# Patient Record
Sex: FEMALE | Race: BLACK OR AFRICAN AMERICAN | Employment: FULL TIME | URBAN - METROPOLITAN AREA
[De-identification: names, ages, dates, MRNs, and addresses within clinical notes are randomized per-mention and may not be internally consistent; named-entity substitution may affect disease eponyms.]

---

## 2022-06-07 ENCOUNTER — OFFICE VISIT (OUTPATIENT)
Dept: PRIMARY CARE CLINIC | Age: 27
End: 2022-06-07
Payer: COMMERCIAL

## 2022-06-07 ENCOUNTER — HOSPITAL ENCOUNTER (OUTPATIENT)
Dept: GENERAL RADIOLOGY | Age: 27
Discharge: HOME | End: 2022-06-07
Attending: INTERNAL MEDICINE
Payer: COMMERCIAL

## 2022-06-07 VITALS
BODY MASS INDEX: 25.81 KG/M2 | DIASTOLIC BLOOD PRESSURE: 62 MMHG | WEIGHT: 160.6 LBS | HEIGHT: 66 IN | HEART RATE: 79 BPM | SYSTOLIC BLOOD PRESSURE: 98 MMHG | TEMPERATURE: 97.5 F | RESPIRATION RATE: 16 BRPM | OXYGEN SATURATION: 100 %

## 2022-06-07 DIAGNOSIS — R63.4 WEIGHT LOSS: ICD-10-CM

## 2022-06-07 DIAGNOSIS — L21.9 SEBORRHEIC DERMATITIS OF SCALP: ICD-10-CM

## 2022-06-07 DIAGNOSIS — R53.83 FATIGUE, UNSPECIFIED TYPE: ICD-10-CM

## 2022-06-07 DIAGNOSIS — K59.09 OTHER CONSTIPATION: Primary | ICD-10-CM

## 2022-06-07 DIAGNOSIS — R19.7 DIARRHEA, UNSPECIFIED TYPE: ICD-10-CM

## 2022-06-07 DIAGNOSIS — K59.09 OTHER CONSTIPATION: ICD-10-CM

## 2022-06-07 LAB
ALBUMIN SERPL-MCNC: 3.6 G/DL (ref 3.5–5)
ALBUMIN/GLOB SERPL: 1 {RATIO} (ref 1.1–2.2)
ALP SERPL-CCNC: 38 U/L (ref 45–117)
ALT SERPL-CCNC: 16 U/L (ref 12–78)
ANION GAP SERPL CALC-SCNC: 8 MMOL/L (ref 5–15)
AST SERPL-CCNC: 13 U/L (ref 15–37)
BASOPHILS # BLD: 0.1 K/UL (ref 0–0.1)
BASOPHILS NFR BLD: 1 % (ref 0–1)
BILIRUB SERPL-MCNC: 0.3 MG/DL (ref 0.2–1)
BUN SERPL-MCNC: 11 MG/DL (ref 6–20)
BUN/CREAT SERPL: 12 (ref 12–20)
CALCIUM SERPL-MCNC: 9.5 MG/DL (ref 8.5–10.1)
CHLORIDE SERPL-SCNC: 106 MMOL/L (ref 97–108)
CO2 SERPL-SCNC: 25 MMOL/L (ref 21–32)
COMMENT, HOLDF: NORMAL
CREAT SERPL-MCNC: 0.92 MG/DL (ref 0.55–1.02)
DIFFERENTIAL METHOD BLD: NORMAL
EOSINOPHIL # BLD: 0.2 K/UL (ref 0–0.4)
EOSINOPHIL NFR BLD: 6 % (ref 0–7)
ERYTHROCYTE [DISTWIDTH] IN BLOOD BY AUTOMATED COUNT: 14.1 % (ref 11.5–14.5)
GLOBULIN SER CALC-MCNC: 3.7 G/DL (ref 2–4)
GLUCOSE SERPL-MCNC: 99 MG/DL (ref 65–100)
HCT VFR BLD AUTO: 37.8 % (ref 35–47)
HGB BLD-MCNC: 12.1 G/DL (ref 11.5–16)
IMM GRANULOCYTES # BLD AUTO: 0 K/UL (ref 0–0.04)
IMM GRANULOCYTES NFR BLD AUTO: 0 % (ref 0–0.5)
LYMPHOCYTES # BLD: 1.6 K/UL (ref 0.8–3.5)
LYMPHOCYTES NFR BLD: 37 % (ref 12–49)
MCH RBC QN AUTO: 26.1 PG (ref 26–34)
MCHC RBC AUTO-ENTMCNC: 32 G/DL (ref 30–36.5)
MCV RBC AUTO: 81.6 FL (ref 80–99)
MONOCYTES # BLD: 0.3 K/UL (ref 0–1)
MONOCYTES NFR BLD: 7 % (ref 5–13)
NEUTS SEG # BLD: 2.1 K/UL (ref 1.8–8)
NEUTS SEG NFR BLD: 49 % (ref 32–75)
NRBC # BLD: 0 K/UL (ref 0–0.01)
NRBC BLD-RTO: 0 PER 100 WBC
PLATELET # BLD AUTO: 280 K/UL (ref 150–400)
PMV BLD AUTO: 11.5 FL (ref 8.9–12.9)
POTASSIUM SERPL-SCNC: 4.2 MMOL/L (ref 3.5–5.1)
PROT SERPL-MCNC: 7.3 G/DL (ref 6.4–8.2)
RBC # BLD AUTO: 4.63 M/UL (ref 3.8–5.2)
SAMPLES BEING HELD,HOLD: NORMAL
SODIUM SERPL-SCNC: 139 MMOL/L (ref 136–145)
T4 FREE SERPL-MCNC: 1 NG/DL (ref 0.8–1.5)
TSH SERPL DL<=0.05 MIU/L-ACNC: 2.35 UIU/ML (ref 0.36–3.74)
WBC # BLD AUTO: 4.2 K/UL (ref 3.6–11)

## 2022-06-07 PROCEDURE — 74018 RADEX ABDOMEN 1 VIEW: CPT

## 2022-06-07 PROCEDURE — 99204 OFFICE O/P NEW MOD 45 MIN: CPT | Performed by: INTERNAL MEDICINE

## 2022-06-07 RX ORDER — POLYETHYLENE GLYCOL 3350 17 G/17G
17 POWDER, FOR SOLUTION ORAL DAILY
Qty: 850 G | Refills: 4 | Status: SHIPPED | OUTPATIENT
Start: 2022-06-07

## 2022-06-07 RX ORDER — DOCUSATE SODIUM 100 MG/1
100 CAPSULE, LIQUID FILLED ORAL 2 TIMES DAILY
Qty: 60 CAPSULE | Refills: 2 | Status: SHIPPED | OUTPATIENT
Start: 2022-06-07 | End: 2022-09-05

## 2022-06-07 RX ORDER — KETOCONAZOLE 20 MG/ML
SHAMPOO TOPICAL
Qty: 120 ML | Refills: 3 | Status: SHIPPED | OUTPATIENT
Start: 2022-06-07

## 2022-06-07 RX ORDER — SERTRALINE HYDROCHLORIDE 50 MG/1
TABLET, FILM COATED ORAL
COMMUNITY
Start: 2022-04-27

## 2022-06-07 RX ORDER — NORGESTIMATE AND ETHINYL ESTRADIOL 0.25-0.035
1 KIT ORAL DAILY
COMMUNITY
Start: 2022-03-26

## 2022-06-07 NOTE — PROGRESS NOTES
X-ray abdomen shows large amount of stool. I already prescribed MiraLAX and docusate.   Please take it as directed

## 2022-06-07 NOTE — PROGRESS NOTES
Maria Dolores Rios is a 33 y.o.  female and presents with     Chief Complaint   Patient presents with    New Patient    GI Problem     abdominal pain x 1 year        Pt is here to establish care. Pt has diarrhea and constipation, upper abdominal pain, headaches. Headaches - back of head. Dermatitis of scalp. Fatigue, loss of appetitive , 10 pound wt loss. 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 Pt feels anxious. GI issues started in 2020  Pt saw primary in 2020 in [place]. Was  given meds for GERD. No blood work or ultrasound. Had one pregnancy. NO surgeries. Mom and sister have GI issues. Mom has IBS. Brother has GERD. Tried OTC for GERD. Pt has BM once or twice a week. NO thyroid issues. NO h/o Inflammatory bowel disease. NO b blood in stools. NO black stools. No past medical history on file. No past surgical history on file. Current Outpatient Medications   Medication Sig    Estarylla 0.25-35 mg-mcg tab Take 1 Tablet by mouth daily.  sertraline (ZOLOFT) 50 mg tablet     polyethylene glycol (MIRALAX) 17 gram/dose powder Take 17 g by mouth daily.  docusate sodium (COLACE) 100 mg capsule Take 1 Capsule by mouth two (2) times a day for 90 days.  ketoconazole (NIZORAL) 2 % shampoo Apply to scalp twice weekly     No current facility-administered medications for this visit. Health Maintenance   Topic Date Due    Hepatitis C Screening  Never done    HPV Age 9Y-34Y (1 - 2-dose series) Never done    DTaP/Tdap/Td series (1 - Tdap) Never done    Pap Smear  Never done    COVID-19 Vaccine (3 - Booster for Moderna series) 07/14/2022    Flu Vaccine (Season Ended) 09/01/2022    Depression Screen  06/07/2023    Pneumococcal 0-64 years  Aged [employer] History   Administered Date(s) Administered    COVID-19, Arn[place]eba Dun, Primary or Immunocompromised Series, MRNA, PF, 100mcg/0.5mL 01/17/2022, 02/14/2022     Patient's last menstrual period was 06/02/2022.         Allergies and Intolerances:   No Known Allergies    Family History:   Family History   Problem Relation Age of Onset    Hypertension Mother     Hypertension Sister        Social History:   She  reports that she has never smoked. She has never used smokeless tobacco.  She  reports no history of alcohol use. Review of Systems:   General: negative for - chills, fatigue, fever, weight change  Psych: negative for - anxiety, depression, irritability or mood swings  ENT: negative for - headaches, hearing change, nasal congestion, oral lesions, sneezing or sore throat  Heme/ Lymph: negative for - bleeding problems, bruising, pallor or swollen lymph nodes  Endo: negative for - hot flashes, polydipsia/polyuria or temperature intolerance  Resp: negative for - cough, shortness of breath or wheezing  CV: negative for - chest pain, edema or palpitations  GI: negative for - abdominal pain, change in bowel habits, constipation, diarrhea or nausea/vomiting  : negative for - dysuria, hematuria, incontinence, pelvic pain or vulvar/vaginal symptoms  MSK: negative for - joint pain, joint swelling or muscle pain  Neuro: negative for - confusion, headaches, seizures or weakness  Derm: negative for - dry skin, hair changes, rash or skin lesion changes          Physical:   Vitals:   Vitals:    06/07/22 0758   BP: 98/62   Pulse: 79   Resp: 16   Temp: 97.5 °F (36.4 °C)   TempSrc: Temporal   SpO2: 100%   Weight: 160 lb 9.6 oz (72.8 kg)   Height: 5' 6.25\" (1.683 m)           Exam:   HEENT- atraumatic,normocephalic, awake, oriented, well nourished  Neck - supple,no enlarged lymph nodes, no JVD, no thyromegaly  Chest- CTA, no rhonchi, no crackles  Heart- rrr, no murmurs / gallop/rub  Abdomen- soft,BS+,NT, no hepatosplenomegaly  Ext - no c/c/edema   Neuro- no focal deficits. Power 5/5 all extremities  Skin - warm,dry, no obvious rashes.           Review of Data:   LABS:   No results found for: WBC, HGB, HCT, PLT, HGBEXT, HCTEXT, PLTEXT, HGBEXT, HCTEXT, PLTEXT  No results found for: NA, K, CL, CO2, GLU, BUN, CREA  No results found for: CHOL, CHOLX, CHLST, CHOLV, HDL, HDLP, LDL, LDLC, DLDLP, TGLX, TRIGL, TRIGP  No components found for: GPT        Impression / Plan:        ICD-10-CM ICD-9-CM    1. Other constipation  K59.09 564.09 CBC WITH AUTOMATED DIFF      METABOLIC PANEL, COMPREHENSIVE      TSH 3RD GENERATION      T4, FREE      XR ABD (KUB)      polyethylene glycol (MIRALAX) 17 gram/dose powder      docusate sodium (COLACE) 100 mg capsule      T4, FREE      TSH 3RD GENERATION      METABOLIC PANEL, COMPREHENSIVE      CBC WITH AUTOMATED DIFF   2. Diarrhea, unspecified type  R19.7 787.91 CBC WITH AUTOMATED DIFF      METABOLIC PANEL, COMPREHENSIVE      TSH 3RD GENERATION      T4, FREE      T4, FREE      TSH 3RD GENERATION      METABOLIC PANEL, COMPREHENSIVE      CBC WITH AUTOMATED DIFF   3. Weight loss  R63.4 783.21 TSH 3RD GENERATION      T4, FREE      T4, FREE      TSH 3RD GENERATION   4. Fatigue, unspecified type  R53.83 780.79    5. Seborrheic dermatitis of scalp  L21.9 690.18 ketoconazole (NIZORAL) 2 % shampoo       Explained to patient risk benefits of the medications. Advised patient to stop meds if having any side effects. Pt verbalized understanding of the instructions. I have discussed the diagnosis with the patient and the intended plan as seen in the above orders. The patient has received an after-visit summary and questions were answered concerning future plans. I have discussed medication side effects and warnings with the patient as well. I have reviewed the plan of care with the patient, accepted their input and they are in agreement with the treatment goals. Reviewed plan of care. Patient has provided input and agrees with goals. Follow-up and Dispositions    · Return in about 6 weeks (around 7/19/2022).          Jossie Leal MD

## 2022-06-07 NOTE — PROGRESS NOTES
Chief Complaint   Patient presents with    New Patient    GI Problem     abdominal pain x 1 year         Visit Vitals  BP 98/62 (BP 1 Location: Left upper arm, BP Patient Position: Sitting)   Pulse 79   Temp 97.5 °F (36.4 °C) (Temporal)   Resp 16   Ht 5' 6.25\" (1.683 m)   Wt 160 lb 9.6 oz (72.8 kg)   LMP 06/02/2022   SpO2 100%   BMI 25.73 kg/m²        Health Maintenance Due   Topic    Hepatitis C Screening     Depression Screen     COVID-19 Vaccine (1)    HPV Age 9Y-26Y (1 - 2-dose series)    DTaP/Tdap/Td series (1 - Tdap)    Pap Smear         1. Have you been to the ER, urgent care clinic since your last visit? Hospitalized since your last visit? No    2. Have you seen or consulted any other health care providers outside of the [address] [place] [address] since your last visit? Include any pap smears or colon screening.  No

## 2022-10-19 ENCOUNTER — TELEPHONE (OUTPATIENT)
Dept: PRIMARY CARE CLINIC | Age: 27
End: 2022-10-19

## 2022-10-19 ENCOUNTER — NURSE TRIAGE (OUTPATIENT)
Dept: OTHER | Facility: CLINIC | Age: 27
End: 2022-10-19

## 2022-10-19 NOTE — TELEPHONE ENCOUNTER
Abdominal pain with other symptoms that she didn't want to tell me. She was looking to come in tomorrow or Friday.

## 2022-10-19 NOTE — TELEPHONE ENCOUNTER
Location of patient: [address] [place] from [place] at [facility] with [employer]. Subjective: Caller states \"Stomach pain, worse since seeing PCP in June\"     Current Symptoms: Abdominal pain, saw PCP June for abdominal pain-had xray done, worse and worsen in past 24 hours, entire abdomen is bloated, pain is when eating or right after eating. Family history of stomach cancer. Blood in stool last week. Pain is 8 or 9 comes and goes but has been intermittent for >24 hours. Onset: 4 months ago; gradual, intermittent, worsening the last couple weeks    Associated Symptoms: NA    Pain Severity: Denies    Temperature: Denies    What has been tried: None    LMP:  9/19/22  Pregnant: No    Recommended disposition: See in Office Today    Care advice provided, patient verbalizes understanding; denies any other questions or concerns; instructed to call back for any new or worsening symptoms. Patient/Caller agrees with recommended disposition; writer provided warm transfer to Jose L Nelson at [facility] for appointment scheduling    Attention Provider: Thank you for allowing me to participate in the care of your patient. The patient was connected to triage in response to information provided to the [facility]. Please do not respond through this encounter as the response is not directed to a shared pool.       Reason for Disposition   MODERATE pain (e.g., interferes with normal activities that comes and goes (cramps) lasts > 24 hours  (Exception: Pain with Vomiting or Diarrhea - see that Protocol.)    Protocols used: Abdominal Pain - Female-ADULT-[place]

## 2022-10-19 NOTE — TELEPHONE ENCOUNTER
Patient called back. Patient reports having abdominal pain again. Cramping some under R rib, feeling bloated, patient reports still feeling constipated, has not taken anything over the counter for s/s ECC told her see needs to be seen by tomorrow. I told her I still have to check with Dr Willis Asif.

## 2022-10-21 ENCOUNTER — OFFICE VISIT (OUTPATIENT)
Dept: PRIMARY CARE CLINIC | Age: 27
End: 2022-10-21
Payer: COMMERCIAL

## 2022-10-21 VITALS
HEART RATE: 78 BPM | HEIGHT: 66 IN | TEMPERATURE: 99.7 F | BODY MASS INDEX: 25.39 KG/M2 | OXYGEN SATURATION: 99 % | DIASTOLIC BLOOD PRESSURE: 61 MMHG | WEIGHT: 158 LBS | SYSTOLIC BLOOD PRESSURE: 98 MMHG | RESPIRATION RATE: 18 BRPM

## 2022-10-21 DIAGNOSIS — R19.7 DIARRHEA OF PRESUMED INFECTIOUS ORIGIN: ICD-10-CM

## 2022-10-21 DIAGNOSIS — R10.84 GENERALIZED ABDOMINAL PAIN: ICD-10-CM

## 2022-10-21 DIAGNOSIS — K59.09 OTHER CONSTIPATION: Primary | ICD-10-CM

## 2022-10-21 DIAGNOSIS — K62.5 RECTAL BLEEDING: ICD-10-CM

## 2022-10-21 PROCEDURE — 99214 OFFICE O/P EST MOD 30 MIN: CPT | Performed by: INTERNAL MEDICINE

## 2022-10-21 RX ORDER — CIPROFLOXACIN 500 MG/1
500 TABLET ORAL 2 TIMES DAILY
Qty: 6 TABLET | Refills: 0 | Status: SHIPPED | OUTPATIENT
Start: 2022-10-21 | End: 2022-10-24

## 2022-10-21 NOTE — PROGRESS NOTES
Chief Complaint   Patient presents with    Bloated     Pain, loose stools for a few months      Anal Bleeding     Blood in stool a couple of times     1. \"Have you been to the ER, urgent care clinic since your last visit? Hospitalized since your last visit? \" No    2. \"Have you seen or consulted any other health care providers outside of the [address] [place] [address] since your last visit? \" No     3. For patients aged 39-70: Has the patient had a colonoscopy / FIT/ Cologuard? NA - based on age      If the patient is female:    4. For patients aged 41-77: Has the patient had a mammogram within the past 2 years? NA - based on age or sex      11. For patients aged 21-65: Has the patient had a pap smear? Yes - Care Gap present.  Rooming MA/LPN to request most recent results

## 2022-10-21 NOTE — PROGRESS NOTES
Fortino Oliveira is a 33 y.o.  female and presents with     Chief Complaint   Patient presents with    Bloated     Pain, loose stools for a few months      Anal Bleeding     Blood in stool a couple of times     Pt had 4-5 bowel movements in the past week. Pt feels bloated all the time. Patient reported that she had constipation in the past but now she is having diarrhea  Can eat small amount of food. Burning sensation in stomach., nausea , sharp pains in abdomen  Skin feels itchy  Dad has Crohns. Uncle had stomach ulcer. Noticed blood in stools. Also has joint pains- elbow pains  Works at [employer] as . NO alcohol. LMP - yesterday  Drinks 1-2 cups of tea. Is having loose stools or liquidy. Does not think it is food related. Lives by herself. History reviewed. No pertinent past medical history. History reviewed. No pertinent surgical history. Current Outpatient Medications   Medication Sig    ciprofloxacin HCl (CIPRO) 500 mg tablet Take 1 Tablet by mouth two (2) times a day for 3 days. Estarylla 0.25-35 mg-mcg tab Take 1 Tablet by mouth daily. sertraline (ZOLOFT) 50 mg tablet     polyethylene glycol (MIRALAX) 17 gram/dose powder Take 17 g by mouth daily. ketoconazole (NIZORAL) 2 % shampoo Apply to scalp twice weekly     No current facility-administered medications for this visit. Health Maintenance   Topic Date Due    Hepatitis C Screening  Never done    HPV Age 9Y-34Y (1 - 2-dose series) Never done    DTaP/Tdap/Td series (1 - Tdap) Never done    Pap Smear  Never done    COVID-19 Vaccine (3 - Booster for Moderna series) 07/14/2022    Flu Vaccine (1) Never done    Depression Screen  10/21/2023    Pneumococcal 0-64 years  Aged [employer] History   Administered Date(s) Administered    COVID-19, MODERNA BLUE border, Primary or Immunocompromised, (age 18y+), IM, 100 mcg/0.5mL 01/17/2022, 02/14/2022     Patient's last menstrual period was 10/20/2022.         Allergies and Intolerances:   No Known Allergies    Family History:   Family History   Problem Relation Age of Onset    Hypertension Mother     Hypertension Sister        Social History:   She  reports that she has never smoked. She has never used smokeless tobacco.  She  reports no history of alcohol use. Review of Systems:   General: negative for - chills, fatigue, fever, weight change  Psych: negative for - anxiety, depression, irritability or mood swings  ENT: negative for - headaches, hearing change, nasal congestion, oral lesions, sneezing or sore throat  Heme/ Lymph: negative for - bleeding problems, bruising, pallor or swollen lymph nodes  Endo: negative for - hot flashes, polydipsia/polyuria or temperature intolerance  Resp: negative for - cough, shortness of breath or wheezing  CV: negative for - chest pain, edema or palpitations  GI: negative for - abdominal pain, change in bowel habits, constipation, diarrhea or nausea/vomiting  : negative for - dysuria, hematuria, incontinence, pelvic pain or vulvar/vaginal symptoms  MSK: negative for - joint pain, joint swelling or muscle pain  Neuro: negative for - confusion, headaches, seizures or weakness  Derm: negative for - dry skin, hair changes, rash or skin lesion changes          Physical:   Vitals:   Vitals:    10/21/22 0854   BP: 98/61   Pulse: 78   Resp: 18   Temp: 99.7 °F (37.6 °C)   TempSrc: Oral   SpO2: 99%   Weight: 158 lb (71.7 kg)   Height: 5' 6\" (1.676 m)           Exam:   HEENT- atraumatic,normocephalic, awake, oriented, well nourished  Neck - supple,no enlarged lymph nodes, no JVD, no thyromegaly  Chest- CTA, no rhonchi, no crackles  Heart- rrr, no murmurs / gallop/rub  Abdomen- soft,BS+,NT, no hepatosplenomegaly  Ext - no c/c/edema   Neuro- no focal deficits. Power 5/5 all extremities  Skin - warm,dry, no obvious rashes.           Review of Data:   LABS:   Lab Results   Component Value Date/Time    WBC 4.2 06/07/2022 08:29 AM    HGB 12.1 06/07/2022 08:29 AM    HCT 37.8 06/07/2022 08:29 AM    PLATELET 913 00/79/4078 08:29 AM     Lab Results   Component Value Date/Time    Sodium 139 06/07/2022 08:29 AM    Potassium 4.2 06/07/2022 08:29 AM    Chloride 106 06/07/2022 08:29 AM    CO2 25 06/07/2022 08:29 AM    Glucose 99 06/07/2022 08:29 AM    BUN 11 06/07/2022 08:29 AM    Creatinine 0.92 06/07/2022 08:29 AM     No results found for: CHOL, CHOLX, CHLST, CHOLV, HDL, HDLP, LDL, LDLC, DLDLP, TGLX, TRIGL, TRIGP  No components found for: GPT        Impression / Plan:        ICD-10-CM ICD-9-CM    1. Other constipation  K59.09 564.09       2. Rectal bleeding  K62.5 569.3       3. Diarrhea of presumed infectious origin  R19.7 009.3 CBC WITH AUTOMATED DIFF      METABOLIC PANEL, COMPREHENSIVE      REFERRAL TO GASTROENTEROLOGY      ciprofloxacin HCl (CIPRO) 500 mg tablet      H. PYLORI BREATH TEST      4. Generalized abdominal pain  R10.84 789.07 US ABD COMP      LACTIC ACID      LIPASE            Explained to patient risk benefits of the medications. Advised patient to stop meds if having any side effects. Pt verbalized understanding of the instructions. I have discussed the diagnosis with the patient and the intended plan as seen in the above orders. The patient has received an after-visit summary and questions were answered concerning future plans. I have discussed medication side effects and warnings with the patient as well. I have reviewed the plan of care with the patient, accepted their input and they are in agreement with the treatment goals. Reviewed plan of care. Patient has provided input and agrees with goals. Follow-up and Dispositions    Return in about 2 weeks (around 11/4/2022).          Alice Grady MD

## 2022-10-26 ENCOUNTER — TELEPHONE (OUTPATIENT)
Dept: PRIMARY CARE CLINIC | Age: 27
End: 2022-10-26

## 2022-10-26 DIAGNOSIS — R10.84 ABDOMINAL PAIN, GENERALIZED: Primary | ICD-10-CM

## 2022-10-26 DIAGNOSIS — R19.7 DIARRHEA OF PRESUMED INFECTIOUS ORIGIN: ICD-10-CM

## 2022-10-26 NOTE — TELEPHONE ENCOUNTER
----- Message from Ernie Camarena sent at 10/24/2022  2:06 PM EDT -----  Regarding: Lab Notification:  Samples unable to be obtained  We have been notified that samples could not be obtained for the patient below's most recent lab work. Please place new orders prior to the patient's return. If additional assistance is required, please contact Client Services at [phone]. Patient:  Aman Cooper  :  1995  Ordering Provider:  MOSHE Boyer    Thank you,    Yeimy

## 2022-10-31 ENCOUNTER — HOSPITAL ENCOUNTER (OUTPATIENT)
Dept: ULTRASOUND IMAGING | Age: 27
Discharge: HOME | End: 2022-10-31
Attending: INTERNAL MEDICINE
Payer: COMMERCIAL

## 2022-10-31 DIAGNOSIS — R10.84 GENERALIZED ABDOMINAL PAIN: ICD-10-CM

## 2022-10-31 PROCEDURE — 76700 US EXAM ABDOM COMPLETE: CPT

## 2023-03-08 ENCOUNTER — NURSE TRIAGE (OUTPATIENT)
Dept: OTHER | Facility: CLINIC | Age: 28
End: 2023-03-08

## 2023-03-08 NOTE — TELEPHONE ENCOUNTER
Patient transferred over via E[facility] to schedule patient na urgent appointment. Rep explained all that was going on with the patient. When the paint came on the line she was speaking with someone else after writer said hello a couple of times patient stated that she was in a meeting. I asked what is going on and she said I just went over this with someone else and I am in a meeting, and then asked if there was anyway I could call her back.  Explained to patient she is welcome to call the office back when she has time

## 2023-03-08 NOTE — TELEPHONE ENCOUNTER
Location of patient: [address] [place] from [place] at [facility] with [employer]. Subjective: Caller states \"I am having several problems\"     Current Symptoms: fatigue (2 months ago), hearing loss (left ear - 4 months ago), speech issues (\"I is like I know what I am trying to say but I can't find words and sometimes I find the word but can't express it), headaches, nausea (no vomiting), fainted, lightheaded and room spins, (has a history of  ear infections - feels hearing issue may have begun last year), no shortness of air, no heart rhythm issues, events are happening more frequently and getting worse, shakiness in her left hand    Onset: 2 months ago; worsening    Associated Symptoms: NA    Pain Severity: 0/10; aching; intermittent headaches (can be severe)    Temperature: denies     What has been tried: getting plenty of water    LMP: NA Pregnant: No    Recommended disposition: Go to Office Now    Care advice provided, patient verbalizes understanding; denies any other questions or concerns; instructed to call back for any new or worsening symptoms. Patient/Caller agrees with recommended disposition; writer provided warm transfer to [facility] at [facility] for appointment scheduling    Attention Provider: Thank you for allowing me to participate in the care of your patient. The patient was connected to triage in response to information provided to the [facility]. Please do not respond through this encounter as the response is not directed to a shared pool.       Reason for Disposition   MODERATE weakness (i.e., interferes with work, school, normal activities) and cause unknown  (Exceptions: Weakness with acute minor illness, or weakness from poor fluid intake.)    Protocols used: Weakness (Generalized) and Fatigue-ADULT-[place]

## 2023-03-10 ENCOUNTER — NURSE TRIAGE (OUTPATIENT)
Dept: OTHER | Facility: CLINIC | Age: 28
End: 2023-03-10

## 2023-03-10 NOTE — TELEPHONE ENCOUNTER
Location of patient: [address] [place] from [place] at [facility] with [employer]. Subjective: Caller states \"headaches more frequent\"     Current Symptoms: left sided nodule on neck for 1 week    Onset: 3 months ago; worsening    Associated Symptoms: NA    Pain Severity: 7/10; pain; constant    Temperature: denies     What has been tried: advil    LMP: NA Pregnant: No    Recommended disposition: See in Office Today    Care advice provided, patient verbalizes understanding; denies any other questions or concerns; instructed to call back for any new or worsening symptoms. Patient/Caller agrees with recommended disposition; writer provided warm transfer to [employer] at [facility] for appointment scheduling    Attention Provider: Thank you for allowing me to participate in the care of your patient. The patient was connected to triage in response to information provided to the [facility]. Please do not respond through this encounter as the response is not directed to a shared pool.     Reason for Disposition   Tender node in the neck and also has a sore throat with minimal/no runny nose or cough    Protocols used: Lymph Nodes - Swollen-ADULT-[place]

## 2023-03-24 ENCOUNTER — OFFICE VISIT (OUTPATIENT)
Dept: PRIMARY CARE CLINIC | Age: 28
End: 2023-03-24

## 2023-03-24 VITALS
SYSTOLIC BLOOD PRESSURE: 111 MMHG | HEIGHT: 66 IN | HEART RATE: 83 BPM | WEIGHT: 148 LBS | TEMPERATURE: 97.8 F | DIASTOLIC BLOOD PRESSURE: 74 MMHG | BODY MASS INDEX: 23.78 KG/M2 | OXYGEN SATURATION: 96 % | RESPIRATION RATE: 18 BRPM

## 2023-03-24 DIAGNOSIS — J33.9 NASAL POLYP: ICD-10-CM

## 2023-03-24 DIAGNOSIS — J35.1 ENLARGED TONSILS: Primary | ICD-10-CM

## 2023-03-24 DIAGNOSIS — J34.3 NASAL TURBINATE HYPERTROPHY: ICD-10-CM

## 2023-03-24 DIAGNOSIS — R51.9 NONINTRACTABLE HEADACHE, UNSPECIFIED CHRONICITY PATTERN, UNSPECIFIED HEADACHE TYPE: ICD-10-CM

## 2023-03-24 DIAGNOSIS — Z92.0 HISTORY OF ORAL CONTRACEPTIVE USE: ICD-10-CM

## 2023-03-24 RX ORDER — FLUTICASONE PROPIONATE 50 MCG
2 SPRAY, SUSPENSION (ML) NASAL DAILY
Qty: 1 EACH | Refills: 4 | Status: SHIPPED | OUTPATIENT
Start: 2023-03-24

## 2023-03-24 NOTE — PROGRESS NOTES
Chief Complaint   Patient presents with    Dizziness     For 2 months patient has been feeling dizzy and nauseous, overall does not feel right          There were no vitals taken for this visit. 1. Have you been to the ER, urgent care clinic since your last visit? Hospitalized since your last visit? No    2. Have you seen or consulted any other health care providers outside of the [address] [place] [address] since your last visit? Include any pap smears or colon screening. No      3. For patients aged 39-70: Has the patient had a colonoscopy / FIT/ Cologuard? NA - based on age      If the patient is female:    4. For patients aged 41-77: Has the patient had a mammogram within the past 2 years? No      5. For patients aged 21-65: Has the patient had a pap smear? Yes - Care Gap present.  Most recent result on file

## 2023-03-24 NOTE — PROGRESS NOTES
Lsely Cesar is a 33 y.o.  female and presents with     Chief Complaint   Patient presents with    Dizziness     For 2 months patient has been feeling dizzy and nauseous, overall does not feel right        Pt has been feeling dizzy for past fw months. Pt one time flet like she is going to fall to the left side. Pt has been having headache  Feels tired. Gets floaters in eyes. NO neurological problems in family. WOrks at [employer]. Pt wears  glasses. Has floaters in eyes. No smoking or alcohol. Single , never been pregnant  Gets headaches , almost everyday. , feels heavy. Has nasal drainage. Ringing in ears. NO pets. Lives in apartment  Patient has been on oral contraceptives for over 10 years. No past medical history on file. No past surgical history on file. Current Outpatient Medications   Medication Sig    fluticasone propionate (FLONASE) 50 mcg/actuation nasal spray 2 Sprays by Both Nostrils route daily. Estarylla 0.25-35 mg-mcg tab Take 1 Tablet by mouth daily. sertraline (ZOLOFT) 50 mg tablet     ketoconazole (NIZORAL) 2 % shampoo Apply to scalp twice weekly     No current facility-administered medications for this visit. Health Maintenance   Topic Date Due    Hepatitis C Screening  Never done    Varicella Vaccine (1 of 2 - 2-dose childhood series) Never done    DTaP/Tdap/Td series (1 - Tdap) Never done    Pap Smear  Never done    COVID-19 Vaccine (3 - Booster for Moderna series) 04/11/2022    Flu Vaccine (1) Never done    Depression Screen  10/21/2023    Pneumococcal 0-64 years  Aged [employer] History   Administered Date(s) Administered    COVID-19, MODERNA BLUE border, Primary or Immunocompromised, (age 18y+), IM, 100 mcg/0.5mL 01/17/2022, 02/14/2022     No LMP recorded.         Allergies and Intolerances:   No Known Allergies    Family History:   Family History   Problem Relation Age of Onset    Hypertension Mother     Hypertension Sister        Social History:   She reports that she has never smoked. She has never used smokeless tobacco.  She  reports no history of alcohol use. Review of Systems:   General: negative for - chills, fatigue, fever, weight change  Psych: negative for - anxiety, depression, irritability or mood swings  ENT: negative for - headaches, hearing change, nasal congestion, oral lesions, sneezing or sore throat  Heme/ Lymph: negative for - bleeding problems, bruising, pallor or swollen lymph nodes  Endo: negative for - hot flashes, polydipsia/polyuria or temperature intolerance  Resp: negative for - cough, shortness of breath or wheezing  CV: negative for - chest pain, edema or palpitations  GI: negative for - abdominal pain, change in bowel habits, constipation, diarrhea or nausea/vomiting  : negative for - dysuria, hematuria, incontinence, pelvic pain or vulvar/vaginal symptoms  MSK: negative for - joint pain, joint swelling or muscle pain  Neuro: negative for - confusion, headaches, seizures or weakness  Derm: negative for - dry skin, hair changes, rash or skin lesion changes          Physical:   Vitals:   Vitals:    03/24/23 1206   BP: 111/74   Pulse: 83   Resp: 18   Temp: 97.8 °F (36.6 °C)   TempSrc: Temporal   SpO2: 96%   Weight: 148 lb (67.1 kg)   Height: 5' 6\" (1.676 m)           Exam:   HEENT- atraumatic,normocephalic, awake, oriented, well nourished  Neck - supple,no enlarged lymph nodes, no JVD, no thyromegaly  Chest- CTA, no rhonchi, no crackles  Heart- rrr, no murmurs / gallop/rub  Abdomen- soft,BS+,NT, no hepatosplenomegaly  Ext - no c/c/edema   Neuro- no focal deficits. Power 5/5 all extremities  Skin - warm,dry, no obvious rashes.           Review of Data:   LABS:   Lab Results   Component Value Date/Time    WBC 4.2 06/07/2022 08:29 AM    HGB 12.1 06/07/2022 08:29 AM    HCT 37.8 06/07/2022 08:29 AM    PLATELET 116 00/32/1569 08:29 AM     Lab Results   Component Value Date/Time    Sodium 139 06/07/2022 08:29 AM    Potassium 4.2 06/07/2022 08:29 AM    Chloride 106 06/07/2022 08:29 AM    CO2 25 06/07/2022 08:29 AM    Glucose 99 06/07/2022 08:29 AM    BUN 11 06/07/2022 08:29 AM    Creatinine 0.92 06/07/2022 08:29 AM     No results found for: CHOL, CHOLX, CHLST, CHOLV, HDL, HDLP, LDL, LDLC, DLDLP, TGLX, TRIGL, TRIGP  No components found for: GPT        Impression / Plan:        ICD-10-CM ICD-9-CM    1. Enlarged tonsils  J35.1 474.11       2. Nasal turbinate hypertrophy  J34.3 478.0 fluticasone propionate (FLONASE) 50 mcg/actuation nasal spray      3. Nasal polyp  J33.9 471.9 fluticasone propionate (FLONASE) 50 mcg/actuation nasal spray      4. Nonintractable headache, unspecified chronicity pattern, unspecified headache type  R51.9 784.0 REFERRAL TO NEUROLOGY      CT HEAD WO CONT      5. History of oral contraceptive use  Z92.0 V87.49 REFERRAL TO NEUROLOGY        Rule out benign intracranial hypertension or pseudotumor cerebri      Explained to patient risk benefits of the medications. Advised patient to stop meds if having any side effects. Pt verbalized understanding of the instructions. I have discussed the diagnosis with the patient and the intended plan as seen in the above orders. The patient has received an after-visit summary and questions were answered concerning future plans. I have discussed medication side effects and warnings with the patient as well. I have reviewed the plan of care with the patient, accepted their input and they are in agreement with the treatment goals. Reviewed plan of care. Patient has provided input and agrees with goals. Follow-up and Dispositions    Return in about 6 months (around 9/24/2023).          nAa Candelario MD

## 2023-03-31 ENCOUNTER — HOSPITAL ENCOUNTER (OUTPATIENT)
Dept: CT IMAGING | Age: 28
Discharge: HOME | End: 2023-03-31
Attending: INTERNAL MEDICINE
Payer: COMMERCIAL

## 2023-03-31 DIAGNOSIS — R51.9 NONINTRACTABLE HEADACHE, UNSPECIFIED CHRONICITY PATTERN, UNSPECIFIED HEADACHE TYPE: ICD-10-CM

## 2023-03-31 PROCEDURE — 70450 CT HEAD/BRAIN W/O DYE: CPT

## 2023-06-06 ENCOUNTER — NURSE TRIAGE (OUTPATIENT)
Dept: OTHER | Facility: CLINIC | Age: 28
End: 2023-06-06

## 2023-06-06 NOTE — TELEPHONE ENCOUNTER
Received call from Breanne at [facility], caller not on line. Complaint: weakness, tingling in both arms but more in her left     Practice Name: [facility]     Caller's telephone number verified as [phone]    Requesting to be called back after 3pm, states she will be in an appointment. Unsuccessful attempt to re-connect with caller via phone, left message for return call to office          Reason for Disposition   Message left on unidentified voice mail. Phone number verified.     Protocols used: No Contact or Duplicate Contact Call-ADULT-[place]

## 2023-07-20 ENCOUNTER — OFFICE VISIT (OUTPATIENT)
Dept: PRIMARY CARE CLINIC | Facility: CLINIC | Age: 28
End: 2023-07-20
Payer: COMMERCIAL

## 2023-07-20 VITALS
BODY MASS INDEX: 24.91 KG/M2 | OXYGEN SATURATION: 100 % | HEIGHT: 66 IN | TEMPERATURE: 97.8 F | RESPIRATION RATE: 18 BRPM | HEART RATE: 76 BPM | WEIGHT: 155 LBS | DIASTOLIC BLOOD PRESSURE: 72 MMHG | SYSTOLIC BLOOD PRESSURE: 107 MMHG

## 2023-07-20 DIAGNOSIS — R51.9 NONINTRACTABLE HEADACHE, UNSPECIFIED CHRONICITY PATTERN, UNSPECIFIED HEADACHE TYPE: ICD-10-CM

## 2023-07-20 DIAGNOSIS — H61.23 EXCESSIVE EAR WAX, BILATERAL: ICD-10-CM

## 2023-07-20 DIAGNOSIS — M25.50 ARTHRALGIA, UNSPECIFIED JOINT: ICD-10-CM

## 2023-07-20 DIAGNOSIS — M25.50 ARTHRALGIA, UNSPECIFIED JOINT: Primary | ICD-10-CM

## 2023-07-20 PROCEDURE — 99213 OFFICE O/P EST LOW 20 MIN: CPT | Performed by: INTERNAL MEDICINE

## 2023-07-20 SDOH — ECONOMIC STABILITY: INCOME INSECURITY: HOW HARD IS IT FOR YOU TO PAY FOR THE VERY BASICS LIKE FOOD, HOUSING, MEDICAL CARE, AND HEATING?: PATIENT DECLINED

## 2023-07-20 SDOH — ECONOMIC STABILITY: FOOD INSECURITY: WITHIN THE PAST 12 MONTHS, YOU WORRIED THAT YOUR FOOD WOULD RUN OUT BEFORE YOU GOT MONEY TO BUY MORE.: PATIENT DECLINED

## 2023-07-20 SDOH — ECONOMIC STABILITY: FOOD INSECURITY: WITHIN THE PAST 12 MONTHS, THE FOOD YOU BOUGHT JUST DIDN'T LAST AND YOU DIDN'T HAVE MONEY TO GET MORE.: PATIENT DECLINED

## 2023-07-20 SDOH — ECONOMIC STABILITY: HOUSING INSECURITY
IN THE LAST 12 MONTHS, WAS THERE A TIME WHEN YOU DID NOT HAVE A STEADY PLACE TO SLEEP OR SLEPT IN A SHELTER (INCLUDING NOW)?: PATIENT REFUSED

## 2023-07-20 ASSESSMENT — PATIENT HEALTH QUESTIONNAIRE - PHQ9
SUM OF ALL RESPONSES TO PHQ QUESTIONS 1-9: 0
2. FEELING DOWN, DEPRESSED OR HOPELESS: 0
1. LITTLE INTEREST OR PLEASURE IN DOING THINGS: 0
SUM OF ALL RESPONSES TO PHQ QUESTIONS 1-9: 0
SUM OF ALL RESPONSES TO PHQ9 QUESTIONS 1 & 2: 0

## 2023-07-20 NOTE — PROGRESS NOTES
Chief Complaint   Patient presents with    Other     Patient has been experiencing symptoms that could be affecting immune system       There were no vitals taken for this visit. 1. Have you been to the ER, urgent care clinic since your last visit? Hospitalized since your last visit? No    2. Have you seen or consulted any other health care providers outside of the [address] [place] [address] since your last visit? Include any pap smears or colon screening. No      3. For patients aged 43-73: Has the patient had a colonoscopy / FIT/ Cologuard? no      If the patient is female:    4. For patients aged 43-66: Has the patient had a mammogram within the past 2 years? No      5. For patients aged 21-65: Has the patient had a pap smear?  No

## 2023-07-20 NOTE — PROGRESS NOTES
Amber Ng is a 33 y.o.  female and presents with     Chief Complaint   Patient presents with    Other     Patient has been experiencing symptoms that could be affecting immune system   Pt works at [employer]  is having brain fog, memory issues  Now she has left ear hearing issues  Has symptoms of veritgo , balance, vision changes , floaters. Pt thinks she has lot of inflammation in the system. Was diagosed with seborrheic dermatitis. Has recurrent HSV. Was tested for HIV and it was negative  Has appt with Neurology in October  Has appt with audiology     No past medical history on file. No past surgical history on file. Current Outpatient Medications   Medication Sig    fluticasone (FLONASE) 50 MCG/ACT nasal spray 2 sprays by Nasal route daily    ketoconazole (NIZORAL) 2 % shampoo Apply to scalp twice weekly    norgestimate-ethinyl estradiol (ORTHO-CYCLEN) 0.25-35 MG-MCG per tablet Take 1 tablet by mouth daily    sertraline (ZOLOFT) 50 MG tablet ceived the following from [facility]: Outside name: sertraline (ZOLOFT) 50 mg tablet     No current facility-administered medications for this visit. Health Maintenance   Topic Date Due    Varicella vaccine (1 of 2 - 2-dose childhood series) Never done    HIV screen  Never done    Hepatitis C screen  Never done    DTaP/Tdap/Td vaccine (1 - Tdap) Never done    Pap smear  Never done    COVID-19 Vaccine (3 - Booster for Moderna series) 04/11/2022    Flu vaccine (1) 08/01/2023    Depression Screen  10/21/2023    Hepatitis A vaccine  Aged Out    Hib vaccine  Aged Out    Meningococcal (ACWY) vaccine  Aged Out    Pneumococcal 0-64 years Vaccine  Aged Out       There is no immunization history on file for this patient. No LMP recorded.         Allergies and Intolerances:   No Known Allergies    Family History:   Family History   Problem Relation Age of Onset    Hypertension Mother     Hypertension Sister        Social History:   She  reports that she

## 2023-07-21 LAB — ERYTHROCYTE [SEDIMENTATION RATE] IN BLOOD: 18 MM/HR (ref 0–20)

## 2023-07-23 LAB
C3 SERPL-MCNC: 195 MG/DL (ref 82–167)
C4 SERPL-MCNC: 39 MG/DL (ref 12–38)

## 2023-07-24 DIAGNOSIS — M25.50 ARTHRALGIA, UNSPECIFIED JOINT: ICD-10-CM

## 2023-07-24 DIAGNOSIS — R76.8 SS-A ANTIBODY POSITIVE: Primary | ICD-10-CM

## 2023-07-24 LAB
ANA SER QL: POSITIVE
CCP IGA+IGG SERPL IA-ACNC: 4 UNITS (ref 0–19)
CENTROMERE B AB SER-ACNC: <0.2 AI (ref 0–0.9)
CHROMATIN AB SERPL-ACNC: <0.2 AI (ref 0–0.9)
DSDNA AB SER-ACNC: 1 IU/ML (ref 0–9)
ENA JO1 AB SER-ACNC: <0.2 AI (ref 0–0.9)
ENA RNP AB SER-ACNC: <0.2 AI (ref 0–0.9)
ENA SCL70 AB SER-ACNC: <0.2 AI (ref 0–0.9)
ENA SM AB SER-ACNC: <0.2 AI (ref 0–0.9)
ENA SS-A AB SER-ACNC: >8 AI (ref 0–0.9)
ENA SS-B AB SER-ACNC: <0.2 AI (ref 0–0.9)
Lab: ABNORMAL

## 2023-07-26 LAB
ALBUMIN SERPL ELPH-MCNC: 3.6 G/DL (ref 2.9–4.4)
ALBUMIN/GLOB SERPL: 1.1 (ref 0.7–1.7)
ALPHA1 GLOB SERPL ELPH-MCNC: 0.2 G/DL (ref 0–0.4)
ALPHA2 GLOB SERPL ELPH-MCNC: 0.9 G/DL (ref 0.4–1)
B-GLOBULIN SERPL ELPH-MCNC: 1.2 G/DL (ref 0.7–1.3)
GAMMA GLOB SERPL ELPH-MCNC: 1.2 G/DL (ref 0.4–1.8)
GLOBULIN SER-MCNC: 3.5 G/DL (ref 2.2–3.9)
IGA SERPL-MCNC: 167 MG/DL (ref 87–352)
IGG SERPL-MCNC: 1303 MG/DL (ref 586–1602)
IGM SERPL-MCNC: 47 MG/DL (ref 26–217)
INTERPRETATION SERPL IEP-IMP: NORMAL
M PROTEIN SERPL ELPH-MCNC: NORMAL G/DL
PROT SERPL-MCNC: 7.1 G/DL (ref 6–8.5)

## 2023-10-23 ENCOUNTER — OFFICE VISIT (OUTPATIENT)
Age: 28
End: 2023-10-23
Payer: COMMERCIAL

## 2023-10-23 VITALS
RESPIRATION RATE: 16 BRPM | BODY MASS INDEX: 24.11 KG/M2 | DIASTOLIC BLOOD PRESSURE: 64 MMHG | SYSTOLIC BLOOD PRESSURE: 104 MMHG | HEIGHT: 66 IN | OXYGEN SATURATION: 100 % | WEIGHT: 150 LBS | HEART RATE: 56 BPM

## 2023-10-23 DIAGNOSIS — R20.0 NUMBNESS AND TINGLING OF RIGHT FACE: ICD-10-CM

## 2023-10-23 DIAGNOSIS — G43.709 CHRONIC MIGRAINE W/O AURA W/O STATUS MIGRAINOSUS, NOT INTRACTABLE: Primary | ICD-10-CM

## 2023-10-23 DIAGNOSIS — G25.0 BENIGN ESSENTIAL TREMOR: ICD-10-CM

## 2023-10-23 DIAGNOSIS — R20.2 NUMBNESS AND TINGLING OF RIGHT FACE: ICD-10-CM

## 2023-10-23 PROCEDURE — 99244 OFF/OP CNSLTJ NEW/EST MOD 40: CPT | Performed by: PSYCHIATRY & NEUROLOGY

## 2023-10-23 RX ORDER — BUPROPION HYDROCHLORIDE 150 MG/1
1 TABLET ORAL EVERY MORNING
COMMUNITY
Start: 2023-02-08

## 2023-10-23 RX ORDER — VALACYCLOVIR HYDROCHLORIDE 500 MG/1
500 TABLET, FILM COATED ORAL DAILY
COMMUNITY
Start: 2023-10-15 | End: 2023-10-23

## 2023-10-23 RX ORDER — SUMATRIPTAN 50 MG/1
50 TABLET, FILM COATED ORAL AS NEEDED
Qty: 9 TABLET | Refills: 0 | Status: SHIPPED | OUTPATIENT
Start: 2023-10-23

## 2023-10-23 RX ORDER — FLUCONAZOLE 150 MG/1
TABLET ORAL
COMMUNITY
Start: 2023-04-25

## 2023-10-23 RX ORDER — PROPRANOLOL HYDROCHLORIDE 10 MG/1
TABLET ORAL
COMMUNITY
Start: 2023-08-15 | End: 2023-10-23

## 2023-10-23 RX ORDER — NORETHINDRONE ACETATE AND ETHINYL ESTRADIOL 1MG-20(21)
1 KIT ORAL DAILY
COMMUNITY
Start: 2023-08-07 | End: 2023-10-23

## 2023-10-23 RX ORDER — ESCITALOPRAM OXALATE 10 MG/1
10 TABLET ORAL DAILY
COMMUNITY
Start: 2023-09-07

## 2023-10-23 ASSESSMENT — PATIENT HEALTH QUESTIONNAIRE - PHQ9
SUM OF ALL RESPONSES TO PHQ QUESTIONS 1-9: 0
2. FEELING DOWN, DEPRESSED OR HOPELESS: 0
SUM OF ALL RESPONSES TO PHQ9 QUESTIONS 1 & 2: 0
SUM OF ALL RESPONSES TO PHQ QUESTIONS 1-9: 0
SUM OF ALL RESPONSES TO PHQ QUESTIONS 1-9: 0
1. LITTLE INTEREST OR PLEASURE IN DOING THINGS: 0
SUM OF ALL RESPONSES TO PHQ QUESTIONS 1-9: 0

## 2023-10-23 NOTE — PROGRESS NOTES
Chief Complaint   Patient presents with    New Patient     Headache        HISTORY OF PRESENT ILLNESS  Nishant Grossman is a 33 y.o. female who came in for neurological consultation requested by , regarding multiple neurological symptoms that she has been experiencing over the past few months. She started experiencing headaches about 3 months ago. These are described as unilateral, squeezing type of pain, associated with light sensitivity, sound sensitivity and occasionally nausea. She typically takes ibuprofen 800 mg which helps but not always. Cannot think of any triggers. Denies any prior history of headaches or any family history. Another symptom she reports is numbness and tingling sensation on the right side of her face and inside her ear. It comes and goes and occurs multiple times per day. Denies any pain or any problems with chewing, biting, swallowing. No double vision or changes in visual acuity. She also has some tremor in her hands that fluctuates and more obvious when she is nervous or anxious.   Underlying medical issues include depression and anxiety for which she takes Wellbutrin and citalopram  She has been noticing that her cognitive processing has slowed down and sometimes she will tend to be forgetful and will struggle to find words especially when she is under stress  Mother has multiple sclerosis  Currently working as a  for [employer]      Past Medical History:   Diagnosis Date    Sjogren's disease ([address])      Current Outpatient Medications   Medication Sig    buPROPion (WELLBUTRIN XL) 150 MG extended release tablet Take 1 tablet by mouth every morning    escitalopram (LEXAPRO) 10 MG tablet Take 1 tablet by mouth daily    SUMAtriptan (IMITREX) 50 MG tablet Take 1 tablet by mouth as needed for Migraine    fluconazole (DIFLUCAN) 150 MG tablet Take 1 tablet by mouth every 3 days for a total of 2 doses (Patient not taking: Reported on 10/23/2023)     No current

## 2023-11-27 ENCOUNTER — HOSPITAL ENCOUNTER (OUTPATIENT)
Facility: HOSPITAL | Age: 28
Discharge: HOME | End: 2023-11-30
Attending: PSYCHIATRY & NEUROLOGY
Payer: COMMERCIAL

## 2023-11-27 DIAGNOSIS — R20.0 NUMBNESS AND TINGLING OF RIGHT FACE: ICD-10-CM

## 2023-11-27 DIAGNOSIS — R20.2 NUMBNESS AND TINGLING OF RIGHT FACE: ICD-10-CM

## 2023-11-27 PROCEDURE — 70551 MRI BRAIN STEM W/O DYE: CPT

## 2024-12-07 ENCOUNTER — HOSPITAL ENCOUNTER (EMERGENCY)
Facility: HOSPITAL | Age: 29
Discharge: HOME | End: 2024-12-07
Attending: EMERGENCY MEDICINE
Payer: COMMERCIAL

## 2024-12-07 ENCOUNTER — APPOINTMENT (OUTPATIENT)
Facility: HOSPITAL | Age: 29
End: 2024-12-07
Payer: COMMERCIAL

## 2024-12-07 VITALS
RESPIRATION RATE: 15 BRPM | SYSTOLIC BLOOD PRESSURE: 120 MMHG | HEART RATE: 80 BPM | OXYGEN SATURATION: 100 % | TEMPERATURE: 97.4 F | BODY MASS INDEX: 26.44 KG/M2 | HEIGHT: 66 IN | WEIGHT: 164.5 LBS | DIASTOLIC BLOOD PRESSURE: 61 MMHG

## 2024-12-07 DIAGNOSIS — N83.201 CYST OF RIGHT OVARY: Primary | ICD-10-CM

## 2024-12-07 LAB
ALBUMIN SERPL-MCNC: 3.4 G/DL (ref 3.5–5)
ALBUMIN/GLOB SERPL: 0.9 (ref 1.1–2.2)
ALP SERPL-CCNC: 44 U/L (ref 45–117)
ALT SERPL-CCNC: 19 U/L (ref 12–78)
ANION GAP SERPL CALC-SCNC: 10 MMOL/L (ref 2–12)
APPEARANCE UR: CLEAR
AST SERPL-CCNC: 16 U/L (ref 15–37)
BACTERIA URNS QL MICRO: NEGATIVE /HPF
BASOPHILS # BLD: 0 K/UL (ref 0–0.1)
BASOPHILS NFR BLD: 1 % (ref 0–1)
BILIRUB SERPL-MCNC: 0.3 MG/DL (ref 0.2–1)
BILIRUB UR QL: NEGATIVE
BUN SERPL-MCNC: 8 MG/DL (ref 6–20)
BUN/CREAT SERPL: 12 (ref 12–20)
CALCIUM SERPL-MCNC: 8.8 MG/DL (ref 8.5–10.1)
CHLORIDE SERPL-SCNC: 105 MMOL/L (ref 97–108)
CLUE CELLS VAG QL WET PREP: NORMAL
CO2 SERPL-SCNC: 25 MMOL/L (ref 21–32)
COLOR UR: NORMAL
CREAT SERPL-MCNC: 0.68 MG/DL (ref 0.55–1.02)
DIFFERENTIAL METHOD BLD: ABNORMAL
EOSINOPHIL # BLD: 0.3 K/UL (ref 0–0.4)
EOSINOPHIL NFR BLD: 5 % (ref 0–7)
EPITH CASTS URNS QL MICRO: NORMAL /LPF
ERYTHROCYTE [DISTWIDTH] IN BLOOD BY AUTOMATED COUNT: 13.3 % (ref 11.5–14.5)
GLOBULIN SER CALC-MCNC: 4 G/DL (ref 2–4)
GLUCOSE SERPL-MCNC: 96 MG/DL (ref 65–100)
GLUCOSE UR STRIP.AUTO-MCNC: NEGATIVE MG/DL
HCG UR QL: NEGATIVE
HCT VFR BLD AUTO: 34.2 % (ref 35–47)
HGB BLD-MCNC: 11.5 G/DL (ref 11.5–16)
HGB UR QL STRIP: NEGATIVE
IMM GRANULOCYTES # BLD AUTO: 0 K/UL (ref 0–0.04)
IMM GRANULOCYTES NFR BLD AUTO: 0 % (ref 0–0.5)
KETONES UR QL STRIP.AUTO: NEGATIVE MG/DL
LEUKOCYTE ESTERASE UR QL STRIP.AUTO: NEGATIVE
LYMPHOCYTES # BLD: 1.5 K/UL (ref 0.8–3.5)
LYMPHOCYTES NFR BLD: 29 % (ref 12–49)
MCH RBC QN AUTO: 27.3 PG (ref 26–34)
MCHC RBC AUTO-ENTMCNC: 33.6 G/DL (ref 30–36.5)
MCV RBC AUTO: 81 FL (ref 80–99)
MONOCYTES # BLD: 0.4 K/UL (ref 0–1)
MONOCYTES NFR BLD: 7 % (ref 5–13)
NEUTS SEG # BLD: 2.9 K/UL (ref 1.8–8)
NEUTS SEG NFR BLD: 58 % (ref 32–75)
NITRITE UR QL STRIP.AUTO: NEGATIVE
NRBC # BLD: 0 K/UL (ref 0–0.01)
NRBC BLD-RTO: 0 PER 100 WBC
PH UR STRIP: 6.5 (ref 5–8)
PLATELET # BLD AUTO: 221 K/UL (ref 150–400)
PMV BLD AUTO: 10.9 FL (ref 8.9–12.9)
POTASSIUM SERPL-SCNC: 3.8 MMOL/L (ref 3.5–5.1)
PROT SERPL-MCNC: 7.4 G/DL (ref 6.4–8.2)
PROT UR STRIP-MCNC: NEGATIVE MG/DL
RBC # BLD AUTO: 4.22 M/UL (ref 3.8–5.2)
RBC #/AREA URNS HPF: NORMAL /HPF (ref 0–5)
SODIUM SERPL-SCNC: 140 MMOL/L (ref 136–145)
SP GR UR REFRACTOMETRY: 1.01
T VAGINALIS VAG QL WET PREP: NORMAL
UROBILINOGEN UR QL STRIP.AUTO: 0.2 EU/DL (ref 0.2–1)
WBC # BLD AUTO: 5 K/UL (ref 3.6–11)
WBC URNS QL MICRO: NORMAL /HPF (ref 0–4)
YEAST: NORMAL

## 2024-12-07 PROCEDURE — 80053 COMPREHEN METABOLIC PANEL: CPT

## 2024-12-07 PROCEDURE — 85025 COMPLETE CBC W/AUTO DIFF WBC: CPT

## 2024-12-07 PROCEDURE — 87591 N.GONORRHOEAE DNA AMP PROB: CPT

## 2024-12-07 PROCEDURE — 96374 THER/PROPH/DIAG INJ IV PUSH: CPT

## 2024-12-07 PROCEDURE — 6360000004 HC RX CONTRAST MEDICATION: Performed by: EMERGENCY MEDICINE

## 2024-12-07 PROCEDURE — 74177 CT ABD & PELVIS W/CONTRAST: CPT

## 2024-12-07 PROCEDURE — 6360000002 HC RX W HCPCS: Performed by: EMERGENCY MEDICINE

## 2024-12-07 PROCEDURE — 99285 EMERGENCY DEPT VISIT HI MDM: CPT

## 2024-12-07 PROCEDURE — 81025 URINE PREGNANCY TEST: CPT

## 2024-12-07 PROCEDURE — 87210 SMEAR WET MOUNT SALINE/INK: CPT

## 2024-12-07 PROCEDURE — 81001 URINALYSIS AUTO W/SCOPE: CPT

## 2024-12-07 PROCEDURE — 87491 CHLMYD TRACH DNA AMP PROBE: CPT

## 2024-12-07 RX ORDER — KETOROLAC TROMETHAMINE 30 MG/ML
15 INJECTION, SOLUTION INTRAMUSCULAR; INTRAVENOUS
Status: COMPLETED | OUTPATIENT
Start: 2024-12-07 | End: 2024-12-07

## 2024-12-07 RX ORDER — IOPAMIDOL 755 MG/ML
100 INJECTION, SOLUTION INTRAVASCULAR
Status: COMPLETED | OUTPATIENT
Start: 2024-12-07 | End: 2024-12-07

## 2024-12-07 RX ORDER — VALGANCICLOVIR 450 MG/1
1000 TABLET, FILM COATED ORAL DAILY
COMMUNITY

## 2024-12-07 RX ORDER — METHYLPHENIDATE HYDROCHLORIDE 18 MG/1
27 TABLET ORAL EVERY MORNING
COMMUNITY

## 2024-12-07 RX ORDER — OXYCODONE AND ACETAMINOPHEN 5; 325 MG/1; MG/1
1 TABLET ORAL EVERY 6 HOURS PRN
Qty: 5 TABLET | Refills: 0 | Status: SHIPPED | OUTPATIENT
Start: 2024-12-07 | End: 2024-12-10

## 2024-12-07 RX ORDER — IBUPROFEN 800 MG/1
800 TABLET, FILM COATED ORAL EVERY 8 HOURS PRN
Qty: 30 TABLET | Refills: 1 | Status: SHIPPED | OUTPATIENT
Start: 2024-12-07 | End: 2024-12-27

## 2024-12-07 RX ADMIN — IOPAMIDOL 100 ML: 755 INJECTION, SOLUTION INTRAVENOUS at 11:09

## 2024-12-07 RX ADMIN — KETOROLAC TROMETHAMINE 15 MG: 30 INJECTION, SOLUTION INTRAMUSCULAR at 12:13

## 2024-12-07 ASSESSMENT — PAIN - FUNCTIONAL ASSESSMENT: PAIN_FUNCTIONAL_ASSESSMENT: 0-10

## 2024-12-07 ASSESSMENT — PAIN SCALES - GENERAL
PAINLEVEL_OUTOF10: 6
PAINLEVEL_OUTOF10: 6

## 2024-12-07 ASSESSMENT — PAIN DESCRIPTION - LOCATION: LOCATION: PELVIS

## 2024-12-07 NOTE — ED TRIAGE NOTES
Pt presents ambulatory to ED complaining of pelvic pain since Tuesday night. Pt reports pain shoots from right groin/lower abdomen to left lower back. Pt denies using BC or having OB/GYN hx. Pt denies vaginal bleeding. Pt states she went to urgent care recently and had urine tested; negative for pregnancy.

## 2024-12-07 NOTE — ED NOTES
Patient here with c/o pelvic and abdominal pain.  Patient states pain in right side of pelvis and RLQ abdomen starting 4 days ago.  Patient states that she initially went to an urgent care but did not improve after discharge.  Patient states LMP was three weeks ago (11/16/2024).  Patient denies fevers, denies changes to diet, does report recent BM, but states difficulty with using the bathroom.  Patient states that the pain increases with position changes, reporting difficulty twisting her torso to the right and reports increased pain with activities such as laughing.  Patient denies concern for pregnancy, denies N/V/D.        Emergency Department Nursing Plan of Care       The Nursing Plan of Care is developed from the Nursing assessment and Emergency Department Attending provider initial evaluation.  The plan of care may be reviewed in the “ED Provider note”.      The Plan of Care was developed with the following considerations:  Patient / Family readiness to learn indicated by:verbalized understanding  Persons(s) to be included in education: patient  Barriers to Learning/Limitations:None      Signed     Page Spivey RN    12/7/2024   9:02 AM

## 2024-12-07 NOTE — ED TRIAGE NOTES
[facility] EMERGENCY DEPT  EMERGENCY DEPARTMENT ENCOUNTER       Pt Name: Mikki Scott  MRN: [id number]  Birthdate 1995  Date of evaluation: 12/7/2024  Provider: Jessica Carreon MD   PCP: Charlie Ibanez MD  Note Started: 8:54 AM 12/7/24     (Please note that parts of this dictation were completed with voice recognition software. Quite often unanticipated grammatical, syntax, homophones, and other interpretive errors are inadvertently transcribed by the computer software. Please disregards these errors. Please excuse any errors that have escaped final proofreading.)    CHIEF COMPLAINT       Chief Complaint   Patient presents with    Pelvic Pain        HISTORY OF PRESENT ILLNESS: 1 or more elements      History From: ***, History limited by: ***none     Mikki Scott is a 29 y.o. female who presents ambulatory diana to the ED with worsening right lower quadrant pain for the last 5 days.  Pain is constant and worse with movement.  Reports mild associated dysuria and sensation that voiding is difficult.  Denies concern for pregnancy.  In fact, patient was seen at urgent care and they did urine testing and that was negative.  Patient describes intermittent left low back pain as well.  Denies recent injury or change in activity.  Denies vaginal bleeding, vaginal discharge.  Denies nausea, vomiting, hematuria, history of ovarian cyst, change in appetite, fever.     Nursing Notes were all reviewed and agreed with or any disagreements were addressed in the HPI.     REVIEW OF SYSTEMS        Positives and Pertinent negatives as per HPI.    PAST HISTORY     Past Medical History:  Past Medical History:   Diagnosis Date    Sjogren's disease (HCC)        Past Surgical History:  No past surgical history on file.    Family History:  Family History   Problem Relation Age of Onset    Hypertension Mother     Mult Sclerosis Mother     Hypertension Sister     Dementia Maternal Grandmother        Social History:  Social History  History:  Social History     Tobacco Use    Smoking status: Never    Smokeless tobacco: Never   Vaping Use    Vaping status: Never Used   Substance Use Topics    Alcohol use: Not Currently    Drug use: Yes     Types: Marijuana (Weed)       Allergies:  No Known Allergies    CURRENT MEDICATIONS      Discharge Medication List as of 12/7/2024 11:49 AM        CONTINUE these medications which have NOT CHANGED    Details   methylphenidate (CONCERTA) 18 MG extended release tablet Take 27 mg by mouth every morning. Max Daily Amount: 27 mgHistorical Med      valGANciclovir (VALCYTE) 450 MG tablet Take 1,000 mg by mouth dailyHistorical Med      buPROPion (WELLBUTRIN XL) 150 MG extended release tablet Take 1 tablet by mouth every morningHistorical Med      escitalopram (LEXAPRO) 10 MG tablet Take 1 tablet by mouth dailyHistorical Med      fluconazole (DIFLUCAN) 150 MG tablet Take 1 tablet by mouth every 3 days for a total of 2 dosesHistorical Med      SUMAtriptan (IMITREX) 50 MG tablet Take 1 tablet by mouth as needed for Migraine, Disp-9 tablet, R-0Normal             SCREENINGS               No data recorded         PHYSICAL EXAM      ED Triage Vitals [12/07/24 0837]   Encounter Vitals Group      /61      Systolic BP Percentile       Diastolic BP Percentile       Pulse 80      Respirations 15      Temp 97.4 °F (36.3 °C)      Temp Source Oral      SpO2 100 %      Weight - Scale 74.6 kg (164 lb 8 oz)      Height 1.676 m (5' 6\")      Head Circumference       Peak Flow       Pain Score       Pain Loc       Pain Education       Exclude from Growth Chart        Physical Exam  Constitutional:       General: She is not in acute distress.     Appearance: She is not ill-appearing, toxic-appearing or diaphoretic.   Cardiovascular:      Rate and Rhythm: Normal rate.   Abdominal:      Comments: Patient has suprapubic and right lower quadrant tenderness with some voluntary guarding.   Musculoskeletal:         General: Normal range of

## 2024-12-07 NOTE — ED NOTES
Patient given copy of dc instructions and 2 script(s). Patient verbalized their understanding of instructions and script(s). Patient given a current medication reconciliation form and verbalized understanding of their medications. Patient verbalized understanding of the importance of discussing medications with his or her physician or clinic they will be following up with. Patient alert and oriented and in no acute distress. Patient discharged from the ER, patient offered a wheelchair, and when offered patient declines wheelchair.

## 2024-12-09 LAB
C TRACH DNA SPEC QL NAA+PROBE: NEGATIVE
N GONORRHOEA DNA SPEC QL NAA+PROBE: NEGATIVE
SAMPLE TYPE: NORMAL
SERVICE CMNT-IMP: NORMAL
SPECIMEN SOURCE: NORMAL

## 2024-12-12 NOTE — ED PROVIDER NOTES
Jessica Carreon MD  Physician  Specialty: Emergency Medicine     ED Triage Notes     Signed     Date of Service: 12/7/2024  8:34 AM     Signed       Expand All Collapse All    [facility] EMERGENCY DEPT  EMERGENCY DEPARTMENT ENCOUNTER        Pt Name: Mikki Scott  MRN: [id number]  Birthdate 1995  Date of evaluation: 12/7/2024  Provider: Jessica Carreon MD   PCP: Charlie Ibanez MD  Note Started: 1:42 PM 12/7/24     (Please note that parts of this dictation were completed with voice recognition software. Quite often unanticipated grammatical, syntax, homophones, and other interpretive errors are inadvertently transcribed by the computer software. Please disregards these errors. Please excuse any errors that have escaped final proofreading.)     CHIEF COMPLAINT           Chief Complaint   Patient presents with    Pelvic Pain         HISTORY OF PRESENT ILLNESS: 1 or more elements      History From: patient, History limited by:  none     Mikki Scott is a 29 y.o. female who presents ambulatory diana to the ED with worsening right lower quadrant pain for the last 5 days.  Pain is constant and worse with movement.  Reports mild associated dysuria and sensation that voiding is difficult.  Denies concern for pregnancy.  In fact, patient was seen at urgent care and they did urine testing and that was negative.  Patient describes intermittent left low back pain as well.  Denies recent injury or change in activity.  Denies vaginal bleeding, vaginal discharge.  Denies nausea, vomiting, hematuria, history of ovarian cyst, change in appetite, fever.     Nursing Notes were all reviewed and agreed with or any disagreements were addressed in the HPI.     REVIEW OF SYSTEMS         Positives and Pertinent negatives as per HPI.     PAST HISTORY      Past Medical History:  Past Medical History        Past Medical History:   Diagnosis Date    Sjogren's disease (HCC)              Past Surgical History:  Past Surgical History

## 2025-01-01 ENCOUNTER — APPOINTMENT (OUTPATIENT)
Facility: HOSPITAL | Age: 30
End: 2025-01-01
Payer: COMMERCIAL

## 2025-01-01 ENCOUNTER — HOSPITAL ENCOUNTER (EMERGENCY)
Facility: HOSPITAL | Age: 30
Discharge: HOME | End: 2025-01-01
Attending: STUDENT IN AN ORGANIZED HEALTH CARE EDUCATION/TRAINING PROGRAM
Payer: COMMERCIAL

## 2025-01-01 VITALS
HEIGHT: 66 IN | DIASTOLIC BLOOD PRESSURE: 61 MMHG | HEART RATE: 86 BPM | SYSTOLIC BLOOD PRESSURE: 112 MMHG | OXYGEN SATURATION: 100 % | WEIGHT: 167 LBS | TEMPERATURE: 99 F | BODY MASS INDEX: 26.84 KG/M2 | RESPIRATION RATE: 15 BRPM

## 2025-01-01 DIAGNOSIS — K52.9 COLITIS: Primary | ICD-10-CM

## 2025-01-01 LAB
ALBUMIN SERPL-MCNC: 3.6 G/DL (ref 3.5–5)
ALBUMIN/GLOB SERPL: 0.8 (ref 1.1–2.2)
ALP SERPL-CCNC: 51 U/L (ref 45–117)
ALT SERPL-CCNC: 18 U/L (ref 12–78)
ANION GAP SERPL CALC-SCNC: 12 MMOL/L (ref 2–12)
APPEARANCE UR: ABNORMAL
AST SERPL-CCNC: 11 U/L (ref 15–37)
BACTERIA URNS QL MICRO: NEGATIVE /HPF
BASOPHILS # BLD: 0.1 K/UL (ref 0–0.1)
BASOPHILS NFR BLD: 1 % (ref 0–1)
BILIRUB SERPL-MCNC: 0.4 MG/DL (ref 0.2–1)
BILIRUB UR QL: NEGATIVE
BUN SERPL-MCNC: 11 MG/DL (ref 6–20)
BUN/CREAT SERPL: 12 (ref 12–20)
CALCIUM SERPL-MCNC: 9.1 MG/DL (ref 8.5–10.1)
CHLORIDE SERPL-SCNC: 101 MMOL/L (ref 97–108)
CO2 SERPL-SCNC: 25 MMOL/L (ref 21–32)
COLOR UR: ABNORMAL
CREAT SERPL-MCNC: 0.93 MG/DL (ref 0.55–1.02)
DIFFERENTIAL METHOD BLD: NORMAL
EOSINOPHIL # BLD: 0.3 K/UL (ref 0–0.4)
EOSINOPHIL NFR BLD: 3 % (ref 0–7)
EPITH CASTS URNS QL MICRO: ABNORMAL /LPF
ERYTHROCYTE [DISTWIDTH] IN BLOOD BY AUTOMATED COUNT: 13.6 % (ref 11.5–14.5)
GLOBULIN SER CALC-MCNC: 4.4 G/DL (ref 2–4)
GLUCOSE SERPL-MCNC: 96 MG/DL (ref 65–100)
GLUCOSE UR STRIP.AUTO-MCNC: NEGATIVE MG/DL
HCG SERPL QL: NEGATIVE
HCT VFR BLD AUTO: 36.7 % (ref 35–47)
HGB BLD-MCNC: 12.2 G/DL (ref 11.5–16)
HGB UR QL STRIP: NEGATIVE
IMM GRANULOCYTES # BLD AUTO: 0 K/UL (ref 0–0.04)
IMM GRANULOCYTES NFR BLD AUTO: 0 % (ref 0–0.5)
KETONES UR QL STRIP.AUTO: NEGATIVE MG/DL
LEUKOCYTE ESTERASE UR QL STRIP.AUTO: ABNORMAL
LIPASE SERPL-CCNC: 25 U/L (ref 13–75)
LYMPHOCYTES # BLD: 1.4 K/UL (ref 0.8–3.5)
LYMPHOCYTES NFR BLD: 16 % (ref 12–49)
MCH RBC QN AUTO: 27.1 PG (ref 26–34)
MCHC RBC AUTO-ENTMCNC: 33.2 G/DL (ref 30–36.5)
MCV RBC AUTO: 81.4 FL (ref 80–99)
MONOCYTES # BLD: 0.6 K/UL (ref 0–1)
MONOCYTES NFR BLD: 7 % (ref 5–13)
NEUTS SEG # BLD: 6.7 K/UL (ref 1.8–8)
NEUTS SEG NFR BLD: 73 % (ref 32–75)
NITRITE UR QL STRIP.AUTO: NEGATIVE
NRBC # BLD: 0 K/UL (ref 0–0.01)
NRBC BLD-RTO: 0 PER 100 WBC
PH UR STRIP: 6 (ref 5–8)
PLATELET # BLD AUTO: 262 K/UL (ref 150–400)
PMV BLD AUTO: 11.7 FL (ref 8.9–12.9)
POTASSIUM SERPL-SCNC: 3.3 MMOL/L (ref 3.5–5.1)
PROT SERPL-MCNC: 8 G/DL (ref 6.4–8.2)
PROT UR STRIP-MCNC: NEGATIVE MG/DL
RBC # BLD AUTO: 4.51 M/UL (ref 3.8–5.2)
RBC #/AREA URNS HPF: ABNORMAL /HPF (ref 0–5)
SODIUM SERPL-SCNC: 138 MMOL/L (ref 136–145)
SP GR UR REFRACTOMETRY: 1.01
URINE CULTURE IF INDICATED: ABNORMAL
UROBILINOGEN UR QL STRIP.AUTO: 0.2 EU/DL (ref 0.2–1)
WBC # BLD AUTO: 9.1 K/UL (ref 3.6–11)
WBC URNS QL MICRO: ABNORMAL /HPF (ref 0–4)

## 2025-01-01 PROCEDURE — 84703 CHORIONIC GONADOTROPIN ASSAY: CPT

## 2025-01-01 PROCEDURE — 76705 ECHO EXAM OF ABDOMEN: CPT

## 2025-01-01 PROCEDURE — 96374 THER/PROPH/DIAG INJ IV PUSH: CPT

## 2025-01-01 PROCEDURE — 83690 ASSAY OF LIPASE: CPT

## 2025-01-01 PROCEDURE — 81001 URINALYSIS AUTO W/SCOPE: CPT

## 2025-01-01 PROCEDURE — 6360000004 HC RX CONTRAST MEDICATION: Performed by: STUDENT IN AN ORGANIZED HEALTH CARE EDUCATION/TRAINING PROGRAM

## 2025-01-01 PROCEDURE — 96375 TX/PRO/DX INJ NEW DRUG ADDON: CPT

## 2025-01-01 PROCEDURE — 99285 EMERGENCY DEPT VISIT HI MDM: CPT

## 2025-01-01 PROCEDURE — 85025 COMPLETE CBC W/AUTO DIFF WBC: CPT

## 2025-01-01 PROCEDURE — 74177 CT ABD & PELVIS W/CONTRAST: CPT

## 2025-01-01 PROCEDURE — 36415 COLL VENOUS BLD VENIPUNCTURE: CPT

## 2025-01-01 PROCEDURE — 80053 COMPREHEN METABOLIC PANEL: CPT

## 2025-01-01 PROCEDURE — 6360000002 HC RX W HCPCS: Performed by: STUDENT IN AN ORGANIZED HEALTH CARE EDUCATION/TRAINING PROGRAM

## 2025-01-01 RX ORDER — IOPAMIDOL 755 MG/ML
100 INJECTION, SOLUTION INTRAVASCULAR
Status: COMPLETED | OUTPATIENT
Start: 2025-01-01 | End: 2025-01-01

## 2025-01-01 RX ORDER — IBUPROFEN 800 MG/1
800 TABLET, FILM COATED ORAL EVERY 6 HOURS PRN
Qty: 56 TABLET | Refills: 0 | Status: SHIPPED | OUTPATIENT
Start: 2025-01-01 | End: 2025-01-15

## 2025-01-01 RX ORDER — KETOROLAC TROMETHAMINE 30 MG/ML
15 INJECTION, SOLUTION INTRAMUSCULAR; INTRAVENOUS
Status: COMPLETED | OUTPATIENT
Start: 2025-01-01 | End: 2025-01-01

## 2025-01-01 RX ORDER — HYDROMORPHONE HYDROCHLORIDE 1 MG/ML
0.5 INJECTION, SOLUTION INTRAMUSCULAR; INTRAVENOUS; SUBCUTANEOUS
Status: COMPLETED | OUTPATIENT
Start: 2025-01-01 | End: 2025-01-01

## 2025-01-01 RX ORDER — OXYCODONE HYDROCHLORIDE 5 MG/1
5 TABLET ORAL EVERY 6 HOURS PRN
Qty: 6 TABLET | Refills: 0 | Status: SHIPPED | OUTPATIENT
Start: 2025-01-01 | End: 2025-01-04

## 2025-01-01 RX ORDER — IBUPROFEN 800 MG/1
800 TABLET, FILM COATED ORAL EVERY 6 HOURS PRN
Qty: 56 TABLET | Refills: 0 | Status: SHIPPED | OUTPATIENT
Start: 2025-01-01 | End: 2025-01-01

## 2025-01-01 RX ORDER — ONDANSETRON 4 MG/1
4 TABLET, ORALLY DISINTEGRATING ORAL 3 TIMES DAILY PRN
Qty: 21 TABLET | Refills: 0 | Status: SHIPPED | OUTPATIENT
Start: 2025-01-01

## 2025-01-01 RX ORDER — ONDANSETRON 4 MG/1
4 TABLET, ORALLY DISINTEGRATING ORAL 3 TIMES DAILY PRN
Qty: 21 TABLET | Refills: 0 | Status: SHIPPED | OUTPATIENT
Start: 2025-01-01 | End: 2025-01-01

## 2025-01-01 RX ADMIN — IOPAMIDOL 100 ML: 755 INJECTION, SOLUTION INTRAVENOUS at 19:13

## 2025-01-01 RX ADMIN — KETOROLAC TROMETHAMINE 15 MG: 30 INJECTION, SOLUTION INTRAMUSCULAR at 20:43

## 2025-01-01 RX ADMIN — HYDROMORPHONE HYDROCHLORIDE 0.5 MG: 1 INJECTION, SOLUTION INTRAMUSCULAR; INTRAVENOUS; SUBCUTANEOUS at 19:38

## 2025-01-01 ASSESSMENT — PAIN DESCRIPTION - ORIENTATION: ORIENTATION: RIGHT;LOWER

## 2025-01-01 ASSESSMENT — PAIN - FUNCTIONAL ASSESSMENT: PAIN_FUNCTIONAL_ASSESSMENT: 0-10

## 2025-01-01 ASSESSMENT — PAIN DESCRIPTION - DESCRIPTORS: DESCRIPTORS: STABBING

## 2025-01-01 ASSESSMENT — PAIN SCALES - GENERAL
PAINLEVEL_OUTOF10: 8

## 2025-01-01 ASSESSMENT — PAIN DESCRIPTION - LOCATION
LOCATION: ABDOMEN
LOCATION: ABDOMEN

## 2025-01-01 ASSESSMENT — PAIN DESCRIPTION - PAIN TYPE: TYPE: ACUTE PAIN

## 2025-01-01 NOTE — ED TRIAGE NOTES
Pt states that she is having severe abdominal pain that started 2 days ago. Pt states that she took Tylenol last night but got no relief.

## 2025-01-01 NOTE — ED NOTES
Pt presents ambulatory to ED complaining of RUQ and RLQ abdominal pain x2 days. Pt denies nausea, vomiting, diarrhea, constipation, fever, or chills. Pt is alert and oriented x 4, RR even and unlabored, skin is warm and dry. Assesment completed and pt updated on plan of care.       Emergency Department Nursing Plan of Care       The Nursing Plan of Care is developed from the Nursing assessment and Emergency Department Attending provider initial evaluation.  The plan of care may be reviewed in the “ED Provider note”.    The Plan of Care was developed with the following considerations:   Patient / Family readiness to learn indicated by:verbalized understanding  Persons(s) to be included in education: patient  Barriers to Learning/Limitations:None    Signed

## 2025-01-01 NOTE — ED PROVIDER NOTES
[facility] EMERGENCY DEPT  EMERGENCY DEPARTMENT ENCOUNTER       Pt Name: Mikki Scott  MRN: [id number]  Birthdate 1995  Date of evaluation: 1/1/2025  Provider: Hunter Villar MD   PCP: Charlie Ibanez MD  Note Started: 6:06 PM 1/1/25     CHIEF COMPLAINT       Chief Complaint   Patient presents with    Abdominal Pain        HISTORY OF PRESENT ILLNESS: 1 or more elements      History From: Patient  None     Mikki Scott is a 29 y.o. female who presents to the emergency department with right-sided abdominal pain.  Patient states symptoms began yesterday and worsened today.  She denies migration of pain.  Pain is worse with attempts to eat or drink.  Patient took Tylenol without relief in symptoms.  She reports associated nausea without vomiting.     Nursing Notes were all reviewed and agreed with or any disagreements were addressed in the HPI.     REVIEW OF SYSTEMS      Review of Systems     Positives and Pertinent negatives as per HPI.    PAST HISTORY     Past Medical History:  Past Medical History:   Diagnosis Date    Sjogren's disease (HCC)          Past Surgical History:  History reviewed. No pertinent surgical history.    Family History:  Family History   Problem Relation Age of Onset    Hypertension Mother     Mult Sclerosis Mother     Hypertension Sister     Dementia Maternal Grandmother        Social History:  Social History     Tobacco Use    Smoking status: Never    Smokeless tobacco: Never   Vaping Use    Vaping status: Never Used   Substance Use Topics    Alcohol use: Not Currently    Drug use: Yes     Types: Marijuana (Weed)       Allergies:  No Known Allergies    CURRENT MEDICATIONS      Previous Medications    BUPROPION (WELLBUTRIN XL) 150 MG EXTENDED RELEASE TABLET    Take 1 tablet by mouth every morning    ESCITALOPRAM (LEXAPRO) 10 MG TABLET    Take 1 tablet by mouth daily    FLUCONAZOLE (DIFLUCAN) 150 MG TABLET    Take 1 tablet by mouth every 3 days for a total of 2 doses    IBUPROFEN  Cervical back: Neck supple.      Right lower leg: No edema.      Left lower leg: No edema.   Skin:     General: Skin is warm and dry.   Neurological:      General: No focal deficit present.      Mental Status: She is alert.   Psychiatric:         Mood and Affect: Mood normal.         Behavior: Behavior normal.            DIAGNOSTIC RESULTS   LABS:     Recent Results (from the past 24 hour(s))   CBC with Auto Differential    Collection Time: 01/01/25  5:16 PM   Result Value Ref Range    WBC 9.1 3.6 - 11.0 K/uL    RBC 4.51 3.80 - 5.20 M/uL    Hemoglobin 12.2 11.5 - 16.0 g/dL    Hematocrit 36.7 35.0 - 47.0 %    MCV 81.4 80.0 - 99.0 FL    MCH 27.1 26.0 - 34.0 PG    MCHC 33.2 30.0 - 36.5 g/dL    RDW 13.6 11.5 - 14.5 %    Platelets 262 150 - 400 K/uL    MPV 11.7 8.9 - 12.9 FL    Nucleated RBCs 0.0 0  WBC    nRBC 0.00 0.00 - 0.01 K/uL    Neutrophils % 73 32 - 75 %    Lymphocytes % 16 12 - 49 %    Monocytes % 7 5 - 13 %    Eosinophils % 3 0 - 7 %    Basophils % 1 0 - 1 %    Immature Granulocytes % 0 0.0 - 0.5 %    Neutrophils Absolute 6.7 1.8 - 8.0 K/UL    Lymphocytes Absolute 1.4 0.8 - 3.5 K/UL    Monocytes Absolute 0.6 0.0 - 1.0 K/UL    Eosinophils Absolute 0.3 0.0 - 0.4 K/UL    Basophils Absolute 0.1 0.0 - 0.1 K/UL    Immature Granulocytes Absolute 0.0 0.00 - 0.04 K/UL    Differential Type AUTOMATED     Urinalysis with Reflex to Culture    Collection Time: 01/01/25  5:16 PM    Specimen: Urine   Result Value Ref Range    Color, UA YELLOW/STRAW      Appearance CLOUDY (A) CLEAR      Specific Gravity, UA 1.015      pH, Urine 6.0 5.0 - 8.0      Protein, UA Negative NEG mg/dL    Glucose, Ur Negative NEG mg/dL    Ketones, Urine Negative NEG mg/dL    Bilirubin, Urine Negative NEG      Blood, Urine Negative NEG      Urobilinogen, Urine 0.2 0.2 - 1.0 EU/dL    Nitrite, Urine Negative NEG      Leukocyte Esterase, Urine SMALL (A) NEG      WBC, UA 0-4 0 - 4 /hpf    RBC, UA 0-5 0 - 5 /hpf    Epithelial Cells, UA FEW FEW /lpf

## 2025-01-02 NOTE — ED NOTES
Bedside and Verbal shift change report given to DAVE Marcus (oncoming nurse) by DAVE Kline (offgoing nurse). Report included the following information Nurse Handoff Report, ED Encounter Summary, ED SBAR, Intake/Output, MAR, and Recent Results.

## 2025-01-02 NOTE — DISCHARGE INSTRUCTIONS
Thank You!    It was a pleasure taking care of you in our Emergency Department today. We know that when you come to our Emergency Department, you are entrusting us with your health, comfort, and safety. Our physicians and nurses honor that trust, and truly appreciate the opportunity to care for you and your loved ones.      We also value your feedback. If you receive a survey about your Emergency Department experience today, please fill it out.  We care about our patients' feedback, and we listen to what you have to say.  Thank you.    Hunter Villar MD  ________________________________________________________________________  I have included a copy of your lab results and/or radiologic studies from today's visit so you can have them easily available at your follow-up visit. We hope you feel better and please do not hesitate to contact the ED if you have any questions at all!    Recent Results (from the past 12 hour(s))   CBC with Auto Differential    Collection Time: 01/01/25  5:16 PM   Result Value Ref Range    WBC 9.1 3.6 - 11.0 K/uL    RBC 4.51 3.80 - 5.20 M/uL    Hemoglobin 12.2 11.5 - 16.0 g/dL    Hematocrit 36.7 35.0 - 47.0 %    MCV 81.4 80.0 - 99.0 FL    MCH 27.1 26.0 - 34.0 PG    MCHC 33.2 30.0 - 36.5 g/dL    RDW 13.6 11.5 - 14.5 %    Platelets 262 150 - 400 K/uL    MPV 11.7 8.9 - 12.9 FL    Nucleated RBCs 0.0 0  WBC    nRBC 0.00 0.00 - 0.01 K/uL    Neutrophils % 73 32 - 75 %    Lymphocytes % 16 12 - 49 %    Monocytes % 7 5 - 13 %    Eosinophils % 3 0 - 7 %    Basophils % 1 0 - 1 %    Immature Granulocytes % 0 0.0 - 0.5 %    Neutrophils Absolute 6.7 1.8 - 8.0 K/UL    Lymphocytes Absolute 1.4 0.8 - 3.5 K/UL    Monocytes Absolute 0.6 0.0 - 1.0 K/UL    Eosinophils Absolute 0.3 0.0 - 0.4 K/UL    Basophils Absolute 0.1 0.0 - 0.1 K/UL    Immature Granulocytes Absolute 0.0 0.00 - 0.04 K/UL    Differential Type AUTOMATED     Comprehensive Metabolic Panel    Collection Time: 01/01/25  5:16 PM   Result Value  Ref Range    Sodium 138 136 - 145 mmol/L    Potassium 3.3 (L) 3.5 - 5.1 mmol/L    Chloride 101 97 - 108 mmol/L    CO2 25 21 - 32 mmol/L    Anion Gap 12 2 - 12 mmol/L    Glucose 96 65 - 100 mg/dL    BUN 11 6 - 20 MG/DL    Creatinine 0.93 0.55 - 1.02 MG/DL    BUN/Creatinine Ratio 12 12 - 20      Est, Glom Filt Rate 86 >60 ml/min/1.73m2    Calcium 9.1 8.5 - 10.1 MG/DL    Total Bilirubin 0.4 0.2 - 1.0 MG/DL    ALT 18 12 - 78 U/L    AST 11 (L) 15 - 37 U/L    Alk Phosphatase 51 45 - 117 U/L    Total Protein 8.0 6.4 - 8.2 g/dL    Albumin 3.6 3.5 - 5.0 g/dL    Globulin 4.4 (H) 2.0 - 4.0 g/dL    Albumin/Globulin Ratio 0.8 (L) 1.1 - 2.2     Lipase    Collection Time: 01/01/25  5:16 PM   Result Value Ref Range    Lipase 25 13 - 75 U/L   Urinalysis with Reflex to Culture    Collection Time: 01/01/25  5:16 PM    Specimen: Urine   Result Value Ref Range    Color, UA YELLOW/STRAW      Appearance CLOUDY (A) CLEAR      Specific Gravity, UA 1.015      pH, Urine 6.0 5.0 - 8.0      Protein, UA Negative NEG mg/dL    Glucose, Ur Negative NEG mg/dL    Ketones, Urine Negative NEG mg/dL    Bilirubin, Urine Negative NEG      Blood, Urine Negative NEG      Urobilinogen, Urine 0.2 0.2 - 1.0 EU/dL    Nitrite, Urine Negative NEG      Leukocyte Esterase, Urine SMALL (A) NEG      WBC, UA 0-4 0 - 4 /hpf    RBC, UA 0-5 0 - 5 /hpf    Epithelial Cells, UA FEW FEW /lpf    BACTERIA, URINE Negative NEG /hpf    Urine Culture if Indicated CULTURE NOT INDICATED BY UA RESULT CNI     HCG Qualitative, Serum    Collection Time: 01/01/25  5:16 PM   Result Value Ref Range    Preg, Serum Negative NEG       CT ABDOMEN PELVIS W IV CONTRAST Additional Contrast? None   Final Result   Inflammatory process anterior to the mid right colon. Small amount   of free fluid in the right paracolic gutter and pelvis. Differential diagnosis   includes infectious and inflammatory etiologies as well as diverticulitis,   although no inflamed diverticulum is seen. Recommend clinical

## 2025-01-02 NOTE — ED NOTES
Discharge instructions were given to the patient by Sunita ACOSTA.     The patient left the Emergency Department alert and oriented and in no acute distress with 4 prescriptions. The patient was encouraged to call or return to the ED for worsening issues or problems and was encouraged to schedule a follow up appointment for continuing care.     Ambulation assessment completed before discharge.  Pt left Emergency Department ambulating at baseline with no ortho devices  Ortho device education: none    The patient verbalized understanding of discharge instructions and prescriptions, all questions were answered. The patient has no further concerns at this time.

## 2025-01-09 ENCOUNTER — HOSPITAL ENCOUNTER (INPATIENT)
Facility: HOSPITAL | Age: 30
LOS: 7 days | Discharge: HOME | DRG: 372 | End: 2025-01-16
Attending: EMERGENCY MEDICINE | Admitting: HOSPITALIST
Payer: COMMERCIAL

## 2025-01-09 ENCOUNTER — APPOINTMENT (OUTPATIENT)
Facility: HOSPITAL | Age: 30
DRG: 372 | End: 2025-01-09
Payer: COMMERCIAL

## 2025-01-09 DIAGNOSIS — K65.9 PERITONITIS (HCC): Primary | ICD-10-CM

## 2025-01-09 PROBLEM — R50.9 FEVER: Status: ACTIVE | Noted: 2025-01-09

## 2025-01-09 LAB
ALBUMIN SERPL-MCNC: 3.3 G/DL (ref 3.5–5)
ALBUMIN/GLOB SERPL: 0.6 (ref 1.1–2.2)
ALP SERPL-CCNC: 62 U/L (ref 45–117)
ALT SERPL-CCNC: 17 U/L (ref 12–78)
ANION GAP SERPL CALC-SCNC: 7 MMOL/L (ref 2–12)
APPEARANCE UR: CLEAR
AST SERPL-CCNC: 12 U/L (ref 15–37)
BACTERIA URNS QL MICRO: ABNORMAL /HPF
BASOPHILS # BLD: 0.03 K/UL (ref 0–0.1)
BASOPHILS NFR BLD: 0.3 % (ref 0–1)
BILIRUB SERPL-MCNC: 0.3 MG/DL (ref 0.2–1)
BILIRUB UR QL: NEGATIVE
BUN SERPL-MCNC: 9 MG/DL (ref 6–20)
BUN/CREAT SERPL: 11 (ref 12–20)
CALCIUM SERPL-MCNC: 9.8 MG/DL (ref 8.5–10.1)
CHLORIDE SERPL-SCNC: 101 MMOL/L (ref 97–108)
CO2 SERPL-SCNC: 26 MMOL/L (ref 21–32)
COLOR UR: ABNORMAL
COMMENT:: NORMAL
CREAT SERPL-MCNC: 0.83 MG/DL (ref 0.55–1.02)
CRP SERPL-MCNC: 9.37 MG/DL (ref 0–0.3)
DIFFERENTIAL METHOD BLD: ABNORMAL
EOSINOPHIL # BLD: 0.09 K/UL (ref 0–0.4)
EOSINOPHIL NFR BLD: 0.8 % (ref 0–7)
EPITH CASTS URNS QL MICRO: ABNORMAL /LPF
ERYTHROCYTE [DISTWIDTH] IN BLOOD BY AUTOMATED COUNT: 13 % (ref 11.5–14.5)
ERYTHROCYTE [SEDIMENTATION RATE] IN BLOOD: 106 MM/HR (ref 0–20)
GLOBULIN SER CALC-MCNC: 5.4 G/DL (ref 2–4)
GLUCOSE SERPL-MCNC: 103 MG/DL (ref 65–100)
GLUCOSE UR STRIP.AUTO-MCNC: NEGATIVE MG/DL
HCG UR QL: NEGATIVE
HCT VFR BLD AUTO: 32.2 % (ref 35–47)
HGB BLD-MCNC: 10.6 G/DL (ref 11.5–16)
HGB UR QL STRIP: NEGATIVE
IMM GRANULOCYTES # BLD AUTO: 0.04 K/UL (ref 0–0.04)
IMM GRANULOCYTES NFR BLD AUTO: 0.4 % (ref 0–0.5)
KETONES UR QL STRIP.AUTO: NEGATIVE MG/DL
LEUKOCYTE ESTERASE UR QL STRIP.AUTO: NEGATIVE
LIPASE SERPL-CCNC: 24 U/L (ref 13–75)
LYMPHOCYTES # BLD: 1.05 K/UL (ref 0.8–3.5)
LYMPHOCYTES NFR BLD: 9.5 % (ref 12–49)
MCH RBC QN AUTO: 26.6 PG (ref 26–34)
MCHC RBC AUTO-ENTMCNC: 32.9 G/DL (ref 30–36.5)
MCV RBC AUTO: 80.7 FL (ref 80–99)
MONOCYTES # BLD: 0.77 K/UL (ref 0–1)
MONOCYTES NFR BLD: 7 % (ref 5–13)
NEUTS SEG # BLD: 9.02 K/UL (ref 1.8–8)
NEUTS SEG NFR BLD: 82 % (ref 32–75)
NITRITE UR QL STRIP.AUTO: NEGATIVE
NRBC # BLD: 0 K/UL (ref 0–0.01)
NRBC BLD-RTO: 0 PER 100 WBC
PH UR STRIP: 6.5 (ref 5–8)
PLATELET # BLD AUTO: 434 K/UL (ref 150–400)
PMV BLD AUTO: 10.2 FL (ref 8.9–12.9)
POTASSIUM SERPL-SCNC: 3.8 MMOL/L (ref 3.5–5.1)
PROT SERPL-MCNC: 8.7 G/DL (ref 6.4–8.2)
PROT UR STRIP-MCNC: NEGATIVE MG/DL
RBC # BLD AUTO: 3.99 M/UL (ref 3.8–5.2)
RBC #/AREA URNS HPF: ABNORMAL /HPF (ref 0–5)
RHEUMATOID FACT SERPL-ACNC: 11 IU/ML
SODIUM SERPL-SCNC: 134 MMOL/L (ref 136–145)
SP GR UR REFRACTOMETRY: <1.005 (ref 1–1.03)
SPECIMEN HOLD: NORMAL
SPECIMEN HOLD: NORMAL
UROBILINOGEN UR QL STRIP.AUTO: 0.2 EU/DL (ref 0.2–1)
WBC # BLD AUTO: 11 K/UL (ref 3.6–11)
WBC URNS QL MICRO: ABNORMAL /HPF (ref 0–4)

## 2025-01-09 PROCEDURE — 1100000000 HC RM PRIVATE

## 2025-01-09 PROCEDURE — 86140 C-REACTIVE PROTEIN: CPT

## 2025-01-09 PROCEDURE — 99221 1ST HOSP IP/OBS SF/LOW 40: CPT

## 2025-01-09 PROCEDURE — 2580000003 HC RX 258: Performed by: HOSPITALIST

## 2025-01-09 PROCEDURE — 36415 COLL VENOUS BLD VENIPUNCTURE: CPT

## 2025-01-09 PROCEDURE — G0378 HOSPITAL OBSERVATION PER HR: HCPCS

## 2025-01-09 PROCEDURE — 82787 IGG 1 2 3 OR 4 EACH: CPT

## 2025-01-09 PROCEDURE — 87040 BLOOD CULTURE FOR BACTERIA: CPT

## 2025-01-09 PROCEDURE — 6360000002 HC RX W HCPCS: Performed by: HOSPITALIST

## 2025-01-09 PROCEDURE — 83690 ASSAY OF LIPASE: CPT

## 2025-01-09 PROCEDURE — 96361 HYDRATE IV INFUSION ADD-ON: CPT

## 2025-01-09 PROCEDURE — 6370000000 HC RX 637 (ALT 250 FOR IP): Performed by: HOSPITALIST

## 2025-01-09 PROCEDURE — 81025 URINE PREGNANCY TEST: CPT

## 2025-01-09 PROCEDURE — 86235 NUCLEAR ANTIGEN ANTIBODY: CPT

## 2025-01-09 PROCEDURE — 81001 URINALYSIS AUTO W/SCOPE: CPT

## 2025-01-09 PROCEDURE — 2500000003 HC RX 250 WO HCPCS: Performed by: HOSPITALIST

## 2025-01-09 PROCEDURE — 85025 COMPLETE CBC W/AUTO DIFF WBC: CPT

## 2025-01-09 PROCEDURE — 96374 THER/PROPH/DIAG INJ IV PUSH: CPT

## 2025-01-09 PROCEDURE — 80053 COMPREHEN METABOLIC PANEL: CPT

## 2025-01-09 PROCEDURE — 86431 RHEUMATOID FACTOR QUANT: CPT

## 2025-01-09 PROCEDURE — 6370000000 HC RX 637 (ALT 250 FOR IP)

## 2025-01-09 PROCEDURE — 85652 RBC SED RATE AUTOMATED: CPT

## 2025-01-09 PROCEDURE — 86225 DNA ANTIBODY NATIVE: CPT

## 2025-01-09 PROCEDURE — 99222 1ST HOSP IP/OBS MODERATE 55: CPT | Performed by: INTERNAL MEDICINE

## 2025-01-09 PROCEDURE — 96376 TX/PRO/DX INJ SAME DRUG ADON: CPT

## 2025-01-09 PROCEDURE — 6360000004 HC RX CONTRAST MEDICATION: Performed by: RADIOLOGY

## 2025-01-09 PROCEDURE — 74177 CT ABD & PELVIS W/CONTRAST: CPT

## 2025-01-09 PROCEDURE — 99285 EMERGENCY DEPT VISIT HI MDM: CPT

## 2025-01-09 RX ORDER — MAGNESIUM SULFATE IN WATER 40 MG/ML
2000 INJECTION, SOLUTION INTRAVENOUS PRN
Status: DISCONTINUED | OUTPATIENT
Start: 2025-01-09 | End: 2025-01-10

## 2025-01-09 RX ORDER — ONDANSETRON 4 MG/1
4 TABLET, ORALLY DISINTEGRATING ORAL EVERY 8 HOURS PRN
Status: DISCONTINUED | OUTPATIENT
Start: 2025-01-09 | End: 2025-01-16 | Stop reason: HOSPADM

## 2025-01-09 RX ORDER — SODIUM CHLORIDE 9 MG/ML
INJECTION, SOLUTION INTRAVENOUS PRN
Status: DISCONTINUED | OUTPATIENT
Start: 2025-01-09 | End: 2025-01-16 | Stop reason: HOSPADM

## 2025-01-09 RX ORDER — ACETAMINOPHEN 650 MG/1
650 SUPPOSITORY RECTAL EVERY 6 HOURS PRN
Status: DISCONTINUED | OUTPATIENT
Start: 2025-01-09 | End: 2025-01-16 | Stop reason: HOSPADM

## 2025-01-09 RX ORDER — SODIUM CHLORIDE 0.9 % (FLUSH) 0.9 %
5-40 SYRINGE (ML) INJECTION EVERY 12 HOURS SCHEDULED
Status: DISCONTINUED | OUTPATIENT
Start: 2025-01-09 | End: 2025-01-16 | Stop reason: HOSPADM

## 2025-01-09 RX ORDER — POTASSIUM CHLORIDE 750 MG/1
40 TABLET, EXTENDED RELEASE ORAL PRN
Status: DISCONTINUED | OUTPATIENT
Start: 2025-01-09 | End: 2025-01-10

## 2025-01-09 RX ORDER — POLYETHYLENE GLYCOL 3350 17 G/17G
17 POWDER, FOR SOLUTION ORAL DAILY PRN
Status: DISCONTINUED | OUTPATIENT
Start: 2025-01-09 | End: 2025-01-16 | Stop reason: HOSPADM

## 2025-01-09 RX ORDER — ONDANSETRON 2 MG/ML
4 INJECTION INTRAMUSCULAR; INTRAVENOUS EVERY 6 HOURS PRN
Status: DISCONTINUED | OUTPATIENT
Start: 2025-01-09 | End: 2025-01-16 | Stop reason: HOSPADM

## 2025-01-09 RX ORDER — SODIUM CHLORIDE 0.9 % (FLUSH) 0.9 %
5-40 SYRINGE (ML) INJECTION PRN
Status: DISCONTINUED | OUTPATIENT
Start: 2025-01-09 | End: 2025-01-16 | Stop reason: HOSPADM

## 2025-01-09 RX ORDER — POTASSIUM CHLORIDE 7.45 MG/ML
10 INJECTION INTRAVENOUS PRN
Status: DISCONTINUED | OUTPATIENT
Start: 2025-01-09 | End: 2025-01-10

## 2025-01-09 RX ORDER — SODIUM CHLORIDE, SODIUM LACTATE, POTASSIUM CHLORIDE, CALCIUM CHLORIDE 600; 310; 30; 20 MG/100ML; MG/100ML; MG/100ML; MG/100ML
INJECTION, SOLUTION INTRAVENOUS CONTINUOUS
Status: DISCONTINUED | OUTPATIENT
Start: 2025-01-09 | End: 2025-01-14

## 2025-01-09 RX ORDER — ENOXAPARIN SODIUM 100 MG/ML
40 INJECTION SUBCUTANEOUS DAILY
Status: DISCONTINUED | OUTPATIENT
Start: 2025-01-10 | End: 2025-01-10

## 2025-01-09 RX ORDER — IOPAMIDOL 755 MG/ML
100 INJECTION, SOLUTION INTRAVASCULAR
Status: COMPLETED | OUTPATIENT
Start: 2025-01-09 | End: 2025-01-09

## 2025-01-09 RX ORDER — ACETAMINOPHEN 325 MG/1
650 TABLET ORAL EVERY 6 HOURS PRN
Status: DISCONTINUED | OUTPATIENT
Start: 2025-01-09 | End: 2025-01-16 | Stop reason: HOSPADM

## 2025-01-09 RX ORDER — OXYCODONE AND ACETAMINOPHEN 5; 325 MG/1; MG/1
1 TABLET ORAL ONCE
Status: COMPLETED | OUTPATIENT
Start: 2025-01-09 | End: 2025-01-09

## 2025-01-09 RX ADMIN — HYDROMORPHONE HYDROCHLORIDE 1 MG: 0.5 INJECTION, SOLUTION INTRAMUSCULAR; INTRAVENOUS; SUBCUTANEOUS at 20:01

## 2025-01-09 RX ADMIN — SODIUM CHLORIDE, POTASSIUM CHLORIDE, SODIUM LACTATE AND CALCIUM CHLORIDE: 600; 310; 30; 20 INJECTION, SOLUTION INTRAVENOUS at 15:16

## 2025-01-09 RX ADMIN — SODIUM CHLORIDE, PRESERVATIVE FREE 10 ML: 5 INJECTION INTRAVENOUS at 22:43

## 2025-01-09 RX ADMIN — HYDROMORPHONE HYDROCHLORIDE 1 MG: 0.5 INJECTION, SOLUTION INTRAMUSCULAR; INTRAVENOUS; SUBCUTANEOUS at 14:54

## 2025-01-09 RX ADMIN — ACETAMINOPHEN 650 MG: 325 TABLET ORAL at 19:59

## 2025-01-09 RX ADMIN — OXYCODONE HYDROCHLORIDE AND ACETAMINOPHEN 1 TABLET: 5; 325 TABLET ORAL at 08:55

## 2025-01-09 RX ADMIN — SODIUM CHLORIDE, PRESERVATIVE FREE 10 ML: 5 INJECTION INTRAVENOUS at 17:12

## 2025-01-09 RX ADMIN — SODIUM CHLORIDE, PRESERVATIVE FREE 10 ML: 5 INJECTION INTRAVENOUS at 22:45

## 2025-01-09 RX ADMIN — IOPAMIDOL 100 ML: 755 INJECTION, SOLUTION INTRAVENOUS at 09:31

## 2025-01-09 ASSESSMENT — PAIN SCALES - GENERAL
PAINLEVEL_OUTOF10: 7
PAINLEVEL_OUTOF10: 8
PAINLEVEL_OUTOF10: 7

## 2025-01-09 ASSESSMENT — PAIN DESCRIPTION - DESCRIPTORS
DESCRIPTORS: ACHING
DESCRIPTORS: ACHING
DESCRIPTORS: OTHER (COMMENT);ACHING;SHARP
DESCRIPTORS: ACHING;SHARP;OTHER (COMMENT)

## 2025-01-09 ASSESSMENT — PAIN DESCRIPTION - PAIN TYPE
TYPE: ACUTE PAIN

## 2025-01-09 ASSESSMENT — PAIN DESCRIPTION - ORIENTATION
ORIENTATION: RIGHT;LEFT;LOWER
ORIENTATION: LEFT;RIGHT
ORIENTATION: LEFT;LOWER;MID;UPPER
ORIENTATION: LEFT
ORIENTATION: LEFT

## 2025-01-09 ASSESSMENT — PAIN DESCRIPTION - LOCATION
LOCATION: ABDOMEN

## 2025-01-09 ASSESSMENT — PAIN - FUNCTIONAL ASSESSMENT
PAIN_FUNCTIONAL_ASSESSMENT: ACTIVITIES ARE NOT PREVENTED
PAIN_FUNCTIONAL_ASSESSMENT: 0-10
PAIN_FUNCTIONAL_ASSESSMENT: 0-10
PAIN_FUNCTIONAL_ASSESSMENT: PREVENTS OR INTERFERES SOME ACTIVE ACTIVITIES AND ADLS
PAIN_FUNCTIONAL_ASSESSMENT: ACTIVITIES ARE NOT PREVENTED
PAIN_FUNCTIONAL_ASSESSMENT: ACTIVITIES ARE NOT PREVENTED

## 2025-01-09 ASSESSMENT — PAIN DESCRIPTION - ONSET
ONSET: ON-GOING

## 2025-01-09 ASSESSMENT — PAIN DESCRIPTION - FREQUENCY
FREQUENCY: CONTINUOUS

## 2025-01-09 NOTE — ED TRIAGE NOTES
Patient is coming in with abdominal pain since 12/30/24. Patient was seen at [facility] on 1/1/2025 and was stated to having inflammation to the right colon and was prescribed antibiotics and finished the medication. Patient has followed up with GI and had repeat US that showed inflammation. Patient is now having pain to the left side. Denies urinary symptoms. + nausea.

## 2025-01-09 NOTE — CONSULTS
[facility] [address] [address] [phone]        Thank you for requesting this consultation, but this patient has been seen by [facility] in the past.  We have informed them of this request.    Sincerely,  VAN Trevino

## 2025-01-09 NOTE — CONSULTS
[facility]  General Surgery ER Consultation        Admit Date: 1/9/2025  Reason for Consultation: abdominal pain, tenderness and CT suggesting peritonitis/omental inflammaiton     HPI:  Mikki Scott is a 29 y.o. female with past medical history significant for Sjogren's disease presents to [facility] ED with complaints of abdominal pain x 10 days. Pt was seen by GI specialist on 12/30 for c/o RLQ pain, abd ultrasound was ordered, however, pain worsened which prompted her to go to the ED.  Pt was seen at [facility] ED on 1/1/25 with RLQ pain and CT done then revealed non specific colon inflammation and she was sent home with PO Augmentin which she completed this past Monday.  Pt states at home pain improved some, however, pain began to worsen on Tuesday, but now located on the left side of her abd.  Pain associated with nausea; no vomiting. Bowel habits have been mostly regular and at times constipated.  Denies diarrhea.  Denies fever/chills at home, but states her temp was 100.4 degrees upon arrival to ED today. Has been drinking water throughout the day. No solid food since yesterday evening.  CT abd/pelvis done in ED today showed interval increase in extent of the previously identified omental inflammation/peritonitis, now involving the anterior pelvis and mid abdomen.  Denies any abdominal surgical history. LMP 12/23/25.  No leukocytosis, all labs reviewed      CT abd/pelvis 1/9/25  Interval increase in extent of the previously identified omental  inflammation/peritonitis, now involving the anterior pelvis and mid abdomen.  Overall appearance is nonspecific, broad differential includes infectious and  inflammatory processes, as well as autoimmune processes such as mesenteric  Panniculitis    CT abd/pelvis 1/1/25  Inflammatory process anterior to the mid right colon. Small amount  of free fluid in the right paracolic gutter and pelvis. Differential diagnosis  includes infectious and

## 2025-01-09 NOTE — CONSULTS
no distress, appears stated age  Skin: Extremities and face reveal no rashes. No moreno erythema.   HEENT: Sclerae anicteric. Extra-occular muscles are intact.   Cardiovascular: Regular rate and rhythm. No murmurs, gallops, or rubs.   Respiratory: Comfortable breathing with no accessory muscle use. Clear breath sounds with no wheezes, rales, or rhonchi.   GI: Abdomen nondistended, soft, and significantly tender to light palpation Normal active bowel sounds. No enlargement of the liver or spleen. No masses palpable.   Rectal: Deferred   Musculoskeletal: No pitting edema of the lower legs.  No costovertebral tenderness.   Neurological: Gross memory appears intact. Patient is alert and oriented.   Psychiatric: Mood appears appropriate with good judgement.  No anxiety or agitation.      Data Review     Recent Labs     01/09/25  0840   WBC 11.0   HGB 10.6*   HCT 32.2*   *     Recent Labs     01/09/25  0840   *   K 3.8      CO2 26   BUN 9     Recent Labs     01/09/25  0840   GLOB 5.4*     No results for input(s): \"INR\", \"APTT\" in the last 72 hours.    Invalid input(s): \"PTP\"     Imaging studies reviewed    Assessment / Plan :     28 yo AAF w/ pmh Sjogren's, IBS-C, GERD admitted for significant abdominal pain. Imaging shows possible mesenteric panniculitis. GI consulted for question of IBD.     She had EGD/Cscope in 2023 with that showed GERD.     - plan to repeat cscope next week as inpatient, likely Tuesday  - supportive care  - consider bx with IR though yield may be low  - diet as tolerated from GI standpoint  - PPI  - GI following     Patient Active Hospital Problem List:   Principal Problem:    Peritonitis (HCC)  Resolved Problems:    * No resolved hospital problems. *    Robert Tapia PA-C  01/10/25  3:17 PM

## 2025-01-09 NOTE — ED PROVIDER NOTES
[facility] 5W1 ORTHO SPINE  EMERGENCY DEPARTMENT ENCOUNTER      Pt Name: Mikki Scott  MRN: [id number]  Birthdate 1995  Date of evaluation: 1/9/2025  Provider: Angelika Dos Santos PA-C    CHIEF COMPLAINT       Chief Complaint   Patient presents with    Abdominal Pain         HISTORY OF PRESENT ILLNESS   (Location/Symptom, Timing/Onset, Context/Setting, Quality, Duration, Modifying Factors, Severity)  Note limiting factors.   29-year-old female with history of Sjogren's disease presenting with 10 days of abd pain.  Patient states that the pain began in her right lower quadrant and she presented to the ER where she was diagnosed with colitis.  She was sent home on Augmentin.  States that the pain in the right lower quadrant has subsided some but is still there.  She is reporting to the ER again today because she also has severe pain now in the left lower quadrant.  She is endorsing severe pain with walking as well as pain in the car ride over.  Also with nausea and chills.  Denies known fevers, diarrhea, vomiting, chest pain, shortness of breath, dizziness, extremity numbness or weakness.            Review of External Medical Records:     Nursing Notes were reviewed.    REVIEW OF SYSTEMS    (2-9 systems for level 4, 10 or more for level 5)     Review of Systems   Gastrointestinal:  Positive for abdominal pain and nausea.       Except as noted above the remainder of the review of systems was reviewed and negative.       PAST MEDICAL HISTORY     Past Medical History:   Diagnosis Date    Sjogren's disease (HCC)          SURGICAL HISTORY     History reviewed. No pertinent surgical history.      CURRENT MEDICATIONS       Current Discharge Medication List        CONTINUE these medications which have NOT CHANGED    Details   ondansetron (ZOFRAN-ODT) 4 MG disintegrating tablet Take 1 tablet by mouth 3 times daily as needed for Nausea or Vomiting  Qty: 21 tablet, Refills: 0      ibuprofen (ADVIL;MOTRIN) 800 MG tablet Take

## 2025-01-09 NOTE — ED NOTES
MD Powers at bedside. Message sent to MD Parviz REEDER not currently available. Asked for IV fluids to be changed.

## 2025-01-09 NOTE — H&P
History and Physical    Date of Service:  1/9/2025  Primary Care Provider: Charlie Ibanez MD  Source of information: The patient and Chart review    Chief Complaint: Abdominal Pain      History of Presenting Illness:   Mikki Scott is a 29 y.o. female PMH significant for Sjogren's syndrome presented to the ED with worsening abdominal pain.  She developed acute onset right midsection abdominal pain on December 30 and saw GI on December 31.  She had ultrasound and given some pain medications.  She was seen at [facility] ED on 1/1/2025 when CT A/P showed inflammatory process anterior to the mid right colon, small amount of free fluid in the right paracolic gutter and pelvis and she was discharged on Augmentin, Percocet and ibuprofen.  The pain seemed to be improving at the beginning but later moved to the left side and continues to worsen so she came to the ED today.  She denied nausea, vomiting, diarrhea.  She denies fever or chills.  CAT scan of the abdomen pelvis repeated today showed peritoneal or omental thickening and inflammation in the pelvis and mid abdomen and to a lesser extent adjacent to the right colon with small amount of free fluid that said to be increased compared to the same study on January 1.    The patient denies any headache, blurry vision, sore throat, trouble swallowing, trouble with speech, chest pain, SOB, cough, fever, chills, N/V/D, abd pain, urinary symptoms, constipation, recent travels, sick contacts, focal or generalized neurological symptoms, falls, injuries, rashes, contact with COVID-19 diagnosed patients, hematemesis, melena, hemoptysis, hematuria, rashes, denies starting any new medications and denies any other concerns or problems besides as mentioned above.         REVIEW OF SYSTEMS:  A comprehensive review of systems was negative except for that written in the History of Present Illness.     Past Medical History:   Diagnosis Date    Sjogren's disease (HCC)

## 2025-01-10 PROCEDURE — 6360000002 HC RX W HCPCS: Performed by: HOSPITALIST

## 2025-01-10 PROCEDURE — 1100000000 HC RM PRIVATE

## 2025-01-10 PROCEDURE — G0378 HOSPITAL OBSERVATION PER HR: HCPCS

## 2025-01-10 PROCEDURE — 96361 HYDRATE IV INFUSION ADD-ON: CPT

## 2025-01-10 PROCEDURE — 99232 SBSQ HOSP IP/OBS MODERATE 35: CPT | Performed by: INTERNAL MEDICINE

## 2025-01-10 PROCEDURE — 96376 TX/PRO/DX INJ SAME DRUG ADON: CPT

## 2025-01-10 PROCEDURE — 2500000003 HC RX 250 WO HCPCS: Performed by: HOSPITALIST

## 2025-01-10 PROCEDURE — 2580000003 HC RX 258: Performed by: HOSPITALIST

## 2025-01-10 RX ADMIN — HYDROMORPHONE HYDROCHLORIDE 1 MG: 0.5 INJECTION, SOLUTION INTRAMUSCULAR; INTRAVENOUS; SUBCUTANEOUS at 17:21

## 2025-01-10 RX ADMIN — SODIUM CHLORIDE, POTASSIUM CHLORIDE, SODIUM LACTATE AND CALCIUM CHLORIDE: 600; 310; 30; 20 INJECTION, SOLUTION INTRAVENOUS at 13:23

## 2025-01-10 RX ADMIN — HYDROMORPHONE HYDROCHLORIDE 1 MG: 0.5 INJECTION, SOLUTION INTRAMUSCULAR; INTRAVENOUS; SUBCUTANEOUS at 00:18

## 2025-01-10 RX ADMIN — SODIUM CHLORIDE, PRESERVATIVE FREE 10 ML: 5 INJECTION INTRAVENOUS at 13:23

## 2025-01-10 RX ADMIN — HYDROMORPHONE HYDROCHLORIDE 1 MG: 0.5 INJECTION, SOLUTION INTRAMUSCULAR; INTRAVENOUS; SUBCUTANEOUS at 22:05

## 2025-01-10 RX ADMIN — HYDROMORPHONE HYDROCHLORIDE 1 MG: 0.5 INJECTION, SOLUTION INTRAMUSCULAR; INTRAVENOUS; SUBCUTANEOUS at 08:31

## 2025-01-10 RX ADMIN — SODIUM CHLORIDE, PRESERVATIVE FREE 10 ML: 5 INJECTION INTRAVENOUS at 20:22

## 2025-01-10 RX ADMIN — HYDROMORPHONE HYDROCHLORIDE 1 MG: 0.5 INJECTION, SOLUTION INTRAMUSCULAR; INTRAVENOUS; SUBCUTANEOUS at 13:18

## 2025-01-10 RX ADMIN — SODIUM CHLORIDE, PRESERVATIVE FREE 10 ML: 5 INJECTION INTRAVENOUS at 17:22

## 2025-01-10 RX ADMIN — SODIUM CHLORIDE, PRESERVATIVE FREE 10 ML: 5 INJECTION INTRAVENOUS at 08:34

## 2025-01-10 ASSESSMENT — PAIN DESCRIPTION - DESCRIPTORS
DESCRIPTORS: DISCOMFORT
DESCRIPTORS: ACHING
DESCRIPTORS: ACHING;SHARP
DESCRIPTORS: ACHING;SHARP
DESCRIPTORS: ACHING

## 2025-01-10 ASSESSMENT — PAIN DESCRIPTION - LOCATION
LOCATION: ABDOMEN

## 2025-01-10 ASSESSMENT — PAIN SCALES - GENERAL
PAINLEVEL_OUTOF10: 7
PAINLEVEL_OUTOF10: 8
PAINLEVEL_OUTOF10: 9
PAINLEVEL_OUTOF10: 2
PAINLEVEL_OUTOF10: 7
PAINLEVEL_OUTOF10: 6

## 2025-01-10 ASSESSMENT — PAIN DESCRIPTION - ORIENTATION
ORIENTATION: MID
ORIENTATION: LEFT;MID
ORIENTATION: MID

## 2025-01-10 ASSESSMENT — PAIN - FUNCTIONAL ASSESSMENT: PAIN_FUNCTIONAL_ASSESSMENT: PREVENTS OR INTERFERES SOME ACTIVE ACTIVITIES AND ADLS

## 2025-01-10 NOTE — CONSULTS
Infectious Disease Consult Note    Assessment / Plan:       30 y/o female with worsening peritonitis and fever.    Blood cultures x2 sets for fever to 101.    Hold antibiotics.    GI evaluation - could be developing IBD?    If no role for colonoscopy, may need omental biopsy, send for culture, AFB, fungal, cytology.    Infectious workup limited other than above - lack of diarrhea argues against mesenteric adenitis from Yersinia or related process.      If any diarrhea, send stool enteric panel, Giardia, Cryptosporidium.         Reason for Consult: Peritonitis  Date of Consultation: January 9, 2025  Date of Admission: 1/9/2025  Referring Physician: Dr. Del Angel    HPI:    30 y/o female with relatively quiescent Sjogren's disease not on treatment currently otherwise healthy seen with boyfriend OK per patient's permission for persistent abdominal pain.  Was in usual state of health until 12/30/24 when pain started, migrating around abdomen.  Did have ruptured ovarian cyst prior to onset of symptoms and was taking ++ ibuprofen for pain relief.  Seen @ [facility] and treated with PO pain medications and Augmentin after CT noted inflammatory process anterior to right mid colon with right colonic thickening.      Symptoms did not resolve with antibiotic Rx and pain control and admitted for non-resolving symptoms.  CT with worsening of peritonitis.    Currently not trying to conceive.  No known family history of IBD though mother with \"GI issues\".  HIV negative 1/2023.  Prior colonoscopy due to GI issues without noted finding though EGD with gastritis.    Past Medical History:  Past Medical History:   Diagnosis Date    Sjogren's disease (HCC)          Surgical History:  History reviewed. No pertinent surgical history.      Family History:   Family History   Problem Relation Age of Onset    Hypertension Mother     Mult Sclerosis Mother     Hypertension Sister     Dementia Maternal Grandmother          Social History:     Living

## 2025-01-10 NOTE — PROGRESS NOTES
[facility] Adult  Hospitalist Group                                                                                          Hospitalist Progress Note  Josi Venegas, ALF - NP  Office Phone: [phone]        Date of Service:  1/10/2025  NAME:  Mikki Scott  :  1995  MRN:  [id number]       Admission Summary:   Mikki Scott is a 29 y.o. female PMH significant for Sjogren's syndrome presented to the ED with worsening abdominal pain.  She developed acute onset right midsection abdominal pain on  and saw GI on .  She had ultrasound and given some pain medications.  She was seen at [facility] ED on 2025 when CT A/P showed inflammatory process anterior to the mid right colon, small amount of free fluid in the right paracolic gutter and pelvis and she was discharged on Augmentin, Percocet and ibuprofen.  The pain seemed to be improving at the beginning but later moved to the left side and continues to worsen so she came to the ED today.  She denied nausea, vomiting, diarrhea.  She denies fever or chills.  CAT scan of the abdomen pelvis repeated today showed peritoneal or omental thickening and inflammation in the pelvis and mid abdomen and to a lesser extent adjacent to the right colon with small amount of free fluid that said to be increased compared to the same study on .     The patient denies any headache, blurry vision, sore throat, trouble swallowing, trouble with speech, chest pain, SOB, cough, fever, chills, N/V/D, abd pain, urinary symptoms, constipation, recent travels, sick contacts, focal or generalized neurological symptoms, falls, injuries, rashes, contact with COVID-19 diagnosed patients, hematemesis, melena, hemoptysis, hematuria, rashes, denies starting any new medications and denies any other concerns or problems besides as mentioned above.         Interval history / Subjective:      T max 101.8  Pending GI consult  Right sided abdominal  acetaminophen (TYLENOL) tablet 650 mg  650 mg Oral Q6H PRN    Or    acetaminophen (TYLENOL) suppository 650 mg  650 mg Rectal Q6H PRN    HYDROmorphone (DILAUDID) injection 1 mg  1 mg IntraVENous Q4H PRN    lactated ringers infusion   IntraVENous Continuous     ______________________________________________________________________  EXPECTED LENGTH OF STAY: 3  ACTUAL LENGTH OF STAY:          1                 ALF Connelly NP

## 2025-01-10 NOTE — PROGRESS NOTES
Nutrition Note    Pt currently NPO but has cultural/Evangelical/ethnic food preferences. Spoke with pt at bedside, she mentioned that she tries to follow a gluten and dairy free diet at home to reduce gut inflammation but is not strict with the foods she eats. She is fine receiving foods containing gluten as it is not an allergy, but should would like her diet to be lactose controlled. Please add lactose controlled modifier when diet advanced.     Electronically signed by Alycia Holcomb RD on 1/10/25 at 9:09 AM EST    Contact via [employer]

## 2025-01-10 NOTE — PLAN OF CARE
Problem: Discharge Planning  Goal: Discharge to home or other facility with appropriate resources  Outcome: Progressing  Flowsheets (Taken 1/10/2025 1241)  Discharge to home or other facility with appropriate resources: Identify barriers to discharge with patient and caregiver     Problem: Pain  Goal: Verbalizes/displays adequate comfort level or baseline comfort level  Outcome: Progressing  Flowsheets (Taken 1/10/2025 1230)  Verbalizes/displays adequate comfort level or baseline comfort level: Encourage patient to monitor pain and request assistance     Problem: Safety - Adult  Goal: Free from fall injury  Outcome: Progressing

## 2025-01-10 NOTE — PROGRESS NOTES
TRANSFER - IN REPORT:    Verbal report received from Shannan ACOSTA on Mikki Scott  being received from Emergency Department for routine progression of patient care      Report consisted of patient's Situation, Background, Assessment and   Recommendations(SBAR).     Information from the following report(s) ED Encounter Summary, MAR, and Recent Results was reviewed with the receiving nurse Perla Soriano RN.    Opportunity for questions and clarification was provided.      Assessment completed upon patient's arrival to unit and care assumed.

## 2025-01-10 NOTE — PROGRESS NOTES
RN messaged Elana Ochoa NP (Hospitalist) patient requesting something to eat even if it's just apple sauce. NP informed RN that the General Surgery note stated to keep patient NPO for now.

## 2025-01-10 NOTE — PROGRESS NOTES
RN messaged Elana Ochoa NP (Hospitalist) Patient requesting a sleep aid. Patient doesn't think that she will sleep well tonight due to being hungry.     RN is awaiting a response.

## 2025-01-10 NOTE — PROGRESS NOTES
At 19:56 RN messaged Elana Ochoa NP (Hospitalist) that patient's temperature earlier in the emergency room was 100.3. Patient's temperature now is 101.8.     RN gave Tylenol 650 mg po at 1959 with small sips of water.     NP ordered Paired blood cultures stat at 21:15.     RN obtained paired blood cultures by 22:30 and took them to the lab.

## 2025-01-11 LAB
ANION GAP SERPL CALC-SCNC: 5 MMOL/L (ref 2–12)
BASOPHILS # BLD: 0.04 K/UL (ref 0–0.1)
BASOPHILS NFR BLD: 0.8 % (ref 0–1)
BUN SERPL-MCNC: 6 MG/DL (ref 6–20)
BUN/CREAT SERPL: 9 (ref 12–20)
CALCIUM SERPL-MCNC: 8.7 MG/DL (ref 8.5–10.1)
CHLORIDE SERPL-SCNC: 107 MMOL/L (ref 97–108)
CO2 SERPL-SCNC: 27 MMOL/L (ref 21–32)
CREAT SERPL-MCNC: 0.67 MG/DL (ref 0.55–1.02)
DIFFERENTIAL METHOD BLD: ABNORMAL
EOSINOPHIL # BLD: 0.22 K/UL (ref 0–0.4)
EOSINOPHIL NFR BLD: 4.7 % (ref 0–7)
ERYTHROCYTE [DISTWIDTH] IN BLOOD BY AUTOMATED COUNT: 13 % (ref 11.5–14.5)
GLUCOSE SERPL-MCNC: 86 MG/DL (ref 65–100)
HCT VFR BLD AUTO: 26.8 % (ref 35–47)
HGB BLD-MCNC: 8.8 G/DL (ref 11.5–16)
IGG4 SER-MCNC: 39 MG/DL (ref 2–96)
IMM GRANULOCYTES # BLD AUTO: 0.03 K/UL (ref 0–0.04)
IMM GRANULOCYTES NFR BLD AUTO: 0.6 % (ref 0–0.5)
LYMPHOCYTES # BLD: 1.14 K/UL (ref 0.8–3.5)
LYMPHOCYTES NFR BLD: 24.2 % (ref 12–49)
MCH RBC QN AUTO: 26.4 PG (ref 26–34)
MCHC RBC AUTO-ENTMCNC: 32.8 G/DL (ref 30–36.5)
MCV RBC AUTO: 80.5 FL (ref 80–99)
MONOCYTES # BLD: 0.48 K/UL (ref 0–1)
MONOCYTES NFR BLD: 10.2 % (ref 5–13)
NEUTS SEG # BLD: 2.81 K/UL (ref 1.8–8)
NEUTS SEG NFR BLD: 59.5 % (ref 32–75)
NRBC # BLD: 0 K/UL (ref 0–0.01)
NRBC BLD-RTO: 0 PER 100 WBC
PLATELET # BLD AUTO: 405 K/UL (ref 150–400)
PMV BLD AUTO: 10 FL (ref 8.9–12.9)
POTASSIUM SERPL-SCNC: 3.8 MMOL/L (ref 3.5–5.1)
RBC # BLD AUTO: 3.33 M/UL (ref 3.8–5.2)
SODIUM SERPL-SCNC: 139 MMOL/L (ref 136–145)
WBC # BLD AUTO: 4.7 K/UL (ref 3.6–11)

## 2025-01-11 PROCEDURE — 96376 TX/PRO/DX INJ SAME DRUG ADON: CPT

## 2025-01-11 PROCEDURE — 80048 BASIC METABOLIC PNL TOTAL CA: CPT

## 2025-01-11 PROCEDURE — 2500000003 HC RX 250 WO HCPCS: Performed by: HOSPITALIST

## 2025-01-11 PROCEDURE — 96361 HYDRATE IV INFUSION ADD-ON: CPT

## 2025-01-11 PROCEDURE — 2580000003 HC RX 258: Performed by: HOSPITALIST

## 2025-01-11 PROCEDURE — 6370000000 HC RX 637 (ALT 250 FOR IP): Performed by: NURSE PRACTITIONER

## 2025-01-11 PROCEDURE — 6370000000 HC RX 637 (ALT 250 FOR IP): Performed by: HOSPITALIST

## 2025-01-11 PROCEDURE — 85025 COMPLETE CBC W/AUTO DIFF WBC: CPT

## 2025-01-11 PROCEDURE — 6360000002 HC RX W HCPCS: Performed by: HOSPITALIST

## 2025-01-11 PROCEDURE — 1100000000 HC RM PRIVATE

## 2025-01-11 PROCEDURE — G0378 HOSPITAL OBSERVATION PER HR: HCPCS

## 2025-01-11 RX ORDER — HYDROMORPHONE HYDROCHLORIDE 2 MG/1
1 TABLET ORAL EVERY 4 HOURS PRN
Status: DISCONTINUED | OUTPATIENT
Start: 2025-01-11 | End: 2025-01-12

## 2025-01-11 RX ADMIN — ACETAMINOPHEN 650 MG: 325 TABLET ORAL at 10:42

## 2025-01-11 RX ADMIN — ACETAMINOPHEN 650 MG: 325 TABLET ORAL at 02:31

## 2025-01-11 RX ADMIN — HYDROMORPHONE HYDROCHLORIDE 1 MG: 2 TABLET ORAL at 20:14

## 2025-01-11 RX ADMIN — HYDROMORPHONE HYDROCHLORIDE 1 MG: 0.5 INJECTION, SOLUTION INTRAMUSCULAR; INTRAVENOUS; SUBCUTANEOUS at 02:22

## 2025-01-11 RX ADMIN — SODIUM CHLORIDE, PRESERVATIVE FREE 10 ML: 5 INJECTION INTRAVENOUS at 10:41

## 2025-01-11 RX ADMIN — HYDROMORPHONE HYDROCHLORIDE 1 MG: 2 TABLET ORAL at 13:48

## 2025-01-11 RX ADMIN — SODIUM CHLORIDE, POTASSIUM CHLORIDE, SODIUM LACTATE AND CALCIUM CHLORIDE: 600; 310; 30; 20 INJECTION, SOLUTION INTRAVENOUS at 02:32

## 2025-01-11 ASSESSMENT — PAIN DESCRIPTION - LOCATION
LOCATION: ABDOMEN

## 2025-01-11 ASSESSMENT — PAIN DESCRIPTION - DESCRIPTORS
DESCRIPTORS: ACHING
DESCRIPTORS: ACHING;DULL;DISCOMFORT

## 2025-01-11 ASSESSMENT — PAIN DESCRIPTION - ORIENTATION: ORIENTATION: MID

## 2025-01-11 ASSESSMENT — PAIN SCALES - GENERAL
PAINLEVEL_OUTOF10: 8
PAINLEVEL_OUTOF10: 6
PAINLEVEL_OUTOF10: 6

## 2025-01-11 NOTE — PLAN OF CARE
Problem: Pain  Goal: Verbalizes/displays adequate comfort level or baseline comfort level  1/10/2025 2356 by Jaky Poole RN  Outcome: Progressing  1/10/2025 1646 by Symone Silveira LPN  Outcome: Progressing  Flowsheets (Taken 1/10/2025 1230)  Verbalizes/displays adequate comfort level or baseline comfort level: Encourage patient to monitor pain and request assistance

## 2025-01-11 NOTE — PROGRESS NOTES
Infectious Disease Progress Note    Assessment / Plan:       30 y/o female with worsening peritonitis and fever.    Follow blood cultures and monitor closely off antibiotics.    GI evaluation - could be developing IBD?    If no role for colonoscopy, may need omental biopsy, send for culture, AFB, fungal, cytology.    If any diarrhea, send stool enteric panel, Giardia, Cryptosporidium.    Will follow intermittently pending above.         Reason for Consult: Peritonitis  Date of Consultation: January 10, 2025  Date of Admission: 1/9/2025  Referring Physician: Dr. Del Angel    HPI:    30 y/o female with relatively quiescent Sjogren's disease not on treatment currently otherwise healthy seen with boyfriend OK per patient's permission for persistent abdominal pain.  Was in usual state of health until 12/30/24 when pain started, migrating around abdomen.  Did have ruptured ovarian cyst prior to onset of symptoms and was taking ++ ibuprofen for pain relief.  Seen @ [facility] and treated with PO pain medications and Augmentin after CT noted inflammatory process anterior to right mid colon with right colonic thickening.      Symptoms did not resolve with antibiotic Rx and pain control and admitted for non-resolving symptoms.  CT with worsening of peritonitis.    Currently not trying to conceive.  No known family history of IBD though mother with \"GI issues\".  HIV negative 1/2023.  Prior colonoscopy due to GI issues without noted finding though EGD with gastritis.    Interval events:    Remains with same abdominal pain on motion, attempting liquid diet, febrile to 101.8 in PM last night    Past Medical History:  Past Medical History:   Diagnosis Date    Sjogren's disease (HCC)          Surgical History:  History reviewed. No pertinent surgical history.      Family History:   Family History   Problem Relation Age of Onset    Hypertension Mother     Mult Sclerosis Mother     Hypertension Sister     Dementia Maternal Grandmother   (LEXAPRO) 10 MG tablet Take 1 tablet by mouth daily (Patient not taking: Reported on 12/7/2024)      fluconazole (DIFLUCAN) 150 MG tablet Take 1 tablet by mouth every 3 days for a total of 2 doses (Patient not taking: Reported on 10/23/2023)      SUMAtriptan (IMITREX) 50 MG tablet Take 1 tablet by mouth as needed for Migraine 9 tablet 0           Physical Exam:    Vitals: Patient Vitals for the past 24 hrs:   Temp Pulse Resp BP SpO2   01/10/25 1953 -- 95 -- 104/73 97 %   01/10/25 1952 99 °F (37.2 °C) 92 18 100/63 98 %   01/10/25 1721 -- -- 16 -- --   01/10/25 1349 97.7 °F (36.5 °C) 91 17 (!) 103/59 99 %   01/10/25 1318 -- -- 16 -- --   01/10/25 0803 98.2 °F (36.8 °C) 92 17 109/65 99 %   01/10/25 0048 99.5 °F (37.5 °C) -- 16 -- --   01/10/25 0015 -- (!) 108 -- 100/65 --     GEN: No distress, pleasant  HEENT: Normocephalic, atraumatic  CV: normal rate to tachycardic  Lungs: Nl effort  Abdomen: guarded  Genitourinary: no mixon  Extremities: no edema  Neuro: Alert, oriented to time, place and situation, moves all extremities to commands, verbal   Skin: no rash  Psych: good affect, good eye contact, non tearful     Central Line:        Labs:   Recent Results (from the past 24 hour(s))   Culture, Blood 1    Collection Time: 01/09/25 10:35 PM    Specimen: Blood   Result Value Ref Range    Special Requests RIGHT  Antecubital        Culture NO GROWTH <24 HRS     Culture, Blood 2    Collection Time: 01/09/25 10:35 PM    Specimen: Blood   Result Value Ref Range    Special Requests LEFT  HAND        Culture NO GROWTH <24 HRS         Microbiology Data:       Blood: NGTD    Imaging: CT A/P with omental and peritoneal thickening without colonic wall changes    Thank you Dr. Del Angel for the opportunity to participate in the care of this patient. Please contact with questions or concerns.     A total time of 35 minutes was spent on today's encounter.  Greater than 50% of the time was spent on the following:  Preparing for visit and

## 2025-01-11 NOTE — CONSULTS
NAME:  Mikki Scott   :   1995   MRN:   [id number]     Seen smiling in the room.  CTAP showed increase in amount of inflammation previously appreciated now involving anterior pelvis and mid abdomen(see reports please).   She has improving abdominal pain.    PMH:  Past Medical History:   Diagnosis Date    Sjogren's disease (HCC)        PSH:  History reviewed. No pertinent surgical history.    Allergies:  No Known Allergies    Home Medications:  Prior to Admission Medications   Prescriptions Last Dose Informant Patient Reported? Taking?   SUMAtriptan (IMITREX) 50 MG tablet   No No   Sig: Take 1 tablet by mouth as needed for Migraine   buPROPion (WELLBUTRIN XL) 150 MG extended release tablet   Yes No   Sig: Take 1 tablet by mouth every morning   Patient not taking: Reported on 2024   escitalopram (LEXAPRO) 10 MG tablet   Yes No   Sig: Take 1 tablet by mouth daily   Patient not taking: Reported on 2024   fluconazole (DIFLUCAN) 150 MG tablet   Yes No   Sig: Take 1 tablet by mouth every 3 days for a total of 2 doses   Patient not taking: Reported on 10/23/2023   ibuprofen (ADVIL;MOTRIN) 800 MG tablet   No No   Sig: Take 1 tablet by mouth every 6 hours as needed for Pain   methylphenidate (CONCERTA) 18 MG extended release tablet   Yes No   Sig: Take 27 mg by mouth every morning. Max Daily Amount: 27 mg   ondansetron (ZOFRAN-ODT) 4 MG disintegrating tablet   No No   Sig: Take 1 tablet by mouth 3 times daily as needed for Nausea or Vomiting   valGANciclovir (VALCYTE) 450 MG tablet   Yes No   Sig: Take 1,000 mg by mouth daily      Facility-Administered Medications: None       Hospital Medications:  Current Facility-Administered Medications   Medication Dose Route Frequency    HYDROmorphone (DILAUDID) tablet 1 mg  1 mg Oral Q4H PRN    sodium chloride flush 0.9 % injection 5-40 mL  5-40 mL IntraVENous 2 times per day    sodium chloride flush 0.9 % injection 5-40 mL  5-40 mL IntraVENous    Neurological: Gross memory appears intact. Patient is alert and oriented.   Psychiatric: Mood appears appropriate.  No anxiety or agitation. Mild frustration.      Data Review     Recent Labs     01/09/25  0840 01/11/25  0140   WBC 11.0 4.7   HGB 10.6* 8.8*   HCT 32.2* 26.8*   * 405*     Recent Labs     01/09/25  0840 01/11/25  0140   * 139   K 3.8 3.8    107   CO2 26 27   BUN 9 6     Recent Labs     01/09/25  0840   GLOB 5.4*     No results for input(s): \"INR\", \"APTT\" in the last 72 hours.    Invalid input(s): \"PTP\"     Imaging studies reviewed    Assessment / Plan :     28 yo AAF w/ Sjogren's, IBS-C and  GERD admitted w/ abdominal pain.   CT shows possible mesenteric panniculitis.     She had EGD/Colonoscopy in 2023 that showed GERD.   I spoke with the patient in detail.  She is frustrated about not getting a confirmatory diagnosis re: her symptoms.  I told her about the possibility of colonoscopy likely on Tuesday secondary to multiple factors especially slot availability secondary to water shortage issue throughout the city.  She appeared to understand.  We also discussed the possibility of biopsy by IR.  Defer this to radiology regarding timing if we do not have a diagnosis.    Continue current supportive care.    Patient will be seen Monday to decide on timing of colonoscopy by our group.    Diet as tolerated.    GI to see as needed over the weekend.     Patient Active Hospital Problem List:   Principal Problem:    Peritonitis (HCC)  Active Problems:    Fever  Resolved Problems:    * No resolved hospital problems. *    Cesar Allen MD  01/11/25  1:12 PM

## 2025-01-11 NOTE — PROGRESS NOTES
Hospitalist Progress Note  AFL Owens NP  Answering service: [phone] OR [phone] from in house phone        Date of Service:  2025  NAME:  Mikki Scott  :  1995  MRN:  [id number]      Admission Summary:   Per H&P    Mikki Scott is a 29 y.o. female PMH significant for Sjogren's syndrome presented to the ED with worsening abdominal pain.  She developed acute onset right midsection abdominal pain on  and saw GI on .  She had ultrasound and given some pain medications.  She was seen at [facility] ED on 2025 when CT A/P showed inflammatory process anterior to the mid right colon, small amount of free fluid in the right paracolic gutter and pelvis and she was discharged on Augmentin, Percocet and ibuprofen.  The pain seemed to be improving at the beginning but later moved to the left side and continues to worsen so she came to the ED today.  She denied nausea, vomiting, diarrhea.  She denies fever or chills.  CAT scan of the abdomen pelvis repeated today showed peritoneal or omental thickening and inflammation in the pelvis and mid abdomen and to a lesser extent adjacent to the right colon with small amount of free fluid that said to be increased compared to the same study on .     The patient denies any headache, blurry vision, sore throat, trouble swallowing, trouble with speech, chest pain, SOB, cough, fever, chills, N/V/D, abd pain, urinary symptoms, constipation, recent travels, sick contacts, focal or generalized neurological symptoms, falls, injuries, rashes, contact with COVID-19 diagnosed patients, hematemesis, melena, hemoptysis, hematuria, rashes, denies starting any new medications and denies any other concerns or problems besides as mentioned above.     Interval history / Subjective:   I saw the patient this morning on rounds.  No complaints the moment  STAY:          2                 ALF Owens - NP

## 2025-01-12 LAB
ALBUMIN SERPL-MCNC: 2.5 G/DL (ref 3.5–5)
ALBUMIN/GLOB SERPL: 0.7 (ref 1.1–2.2)
ALP SERPL-CCNC: 47 U/L (ref 45–117)
ALT SERPL-CCNC: 11 U/L (ref 12–78)
ANION GAP SERPL CALC-SCNC: 6 MMOL/L (ref 2–12)
AST SERPL-CCNC: 16 U/L (ref 15–37)
BASOPHILS # BLD: 0.03 K/UL (ref 0–0.1)
BASOPHILS NFR BLD: 0.7 % (ref 0–1)
BILIRUB SERPL-MCNC: 0.1 MG/DL (ref 0.2–1)
BUN SERPL-MCNC: 3 MG/DL (ref 6–20)
BUN/CREAT SERPL: 5 (ref 12–20)
CALCIUM SERPL-MCNC: 8.7 MG/DL (ref 8.5–10.1)
CHLORIDE SERPL-SCNC: 108 MMOL/L (ref 97–108)
CO2 SERPL-SCNC: 25 MMOL/L (ref 21–32)
CREAT SERPL-MCNC: 0.65 MG/DL (ref 0.55–1.02)
DIFFERENTIAL METHOD BLD: ABNORMAL
EOSINOPHIL # BLD: 0.29 K/UL (ref 0–0.4)
EOSINOPHIL NFR BLD: 6.8 % (ref 0–7)
ERYTHROCYTE [DISTWIDTH] IN BLOOD BY AUTOMATED COUNT: 12.9 % (ref 11.5–14.5)
GLOBULIN SER CALC-MCNC: 3.8 G/DL (ref 2–4)
GLUCOSE SERPL-MCNC: 100 MG/DL (ref 65–100)
HCT VFR BLD AUTO: 28.3 % (ref 35–47)
HGB BLD-MCNC: 9.2 G/DL (ref 11.5–16)
IMM GRANULOCYTES # BLD AUTO: 0.01 K/UL (ref 0–0.04)
IMM GRANULOCYTES NFR BLD AUTO: 0.2 % (ref 0–0.5)
LYMPHOCYTES # BLD: 1.19 K/UL (ref 0.8–3.5)
LYMPHOCYTES NFR BLD: 28.1 % (ref 12–49)
MCH RBC QN AUTO: 25.9 PG (ref 26–34)
MCHC RBC AUTO-ENTMCNC: 32.5 G/DL (ref 30–36.5)
MCV RBC AUTO: 79.7 FL (ref 80–99)
MONOCYTES # BLD: 0.31 K/UL (ref 0–1)
MONOCYTES NFR BLD: 7.3 % (ref 5–13)
NEUTS SEG # BLD: 2.41 K/UL (ref 1.8–8)
NEUTS SEG NFR BLD: 56.9 % (ref 32–75)
NRBC # BLD: 0 K/UL (ref 0–0.01)
NRBC BLD-RTO: 0 PER 100 WBC
PLATELET # BLD AUTO: 433 K/UL (ref 150–400)
PMV BLD AUTO: 9.3 FL (ref 8.9–12.9)
POTASSIUM SERPL-SCNC: 3.7 MMOL/L (ref 3.5–5.1)
PROT SERPL-MCNC: 6.3 G/DL (ref 6.4–8.2)
RBC # BLD AUTO: 3.55 M/UL (ref 3.8–5.2)
SODIUM SERPL-SCNC: 139 MMOL/L (ref 136–145)
WBC # BLD AUTO: 4.2 K/UL (ref 3.6–11)

## 2025-01-12 PROCEDURE — 6370000000 HC RX 637 (ALT 250 FOR IP): Performed by: HOSPITALIST

## 2025-01-12 PROCEDURE — G0378 HOSPITAL OBSERVATION PER HR: HCPCS

## 2025-01-12 PROCEDURE — 80053 COMPREHEN METABOLIC PANEL: CPT

## 2025-01-12 PROCEDURE — 6370000000 HC RX 637 (ALT 250 FOR IP): Performed by: NURSE PRACTITIONER

## 2025-01-12 PROCEDURE — 85025 COMPLETE CBC W/AUTO DIFF WBC: CPT

## 2025-01-12 PROCEDURE — 1100000000 HC RM PRIVATE

## 2025-01-12 RX ORDER — FLUCONAZOLE 100 MG/1
200 TABLET ORAL ONCE
Status: COMPLETED | OUTPATIENT
Start: 2025-01-12 | End: 2025-01-12

## 2025-01-12 RX ORDER — HYDROMORPHONE HYDROCHLORIDE 2 MG/1
2 TABLET ORAL EVERY 4 HOURS PRN
Status: DISCONTINUED | OUTPATIENT
Start: 2025-01-12 | End: 2025-01-16 | Stop reason: HOSPADM

## 2025-01-12 RX ADMIN — HYDROMORPHONE HYDROCHLORIDE 2 MG: 2 TABLET ORAL at 22:37

## 2025-01-12 RX ADMIN — HYDROMORPHONE HYDROCHLORIDE 2 MG: 2 TABLET ORAL at 16:06

## 2025-01-12 RX ADMIN — FLUCONAZOLE 200 MG: 100 TABLET ORAL at 16:06

## 2025-01-12 RX ADMIN — HYDROMORPHONE HYDROCHLORIDE 1 MG: 2 TABLET ORAL at 02:17

## 2025-01-12 RX ADMIN — ACETAMINOPHEN 650 MG: 325 TABLET ORAL at 09:50

## 2025-01-12 RX ADMIN — HYDROMORPHONE HYDROCHLORIDE 1 MG: 2 TABLET ORAL at 11:23

## 2025-01-12 ASSESSMENT — PAIN DESCRIPTION - LOCATION
LOCATION: ABDOMEN

## 2025-01-12 ASSESSMENT — PAIN SCALES - GENERAL
PAINLEVEL_OUTOF10: 7
PAINLEVEL_OUTOF10: 7
PAINLEVEL_OUTOF10: 5
PAINLEVEL_OUTOF10: 5
PAINLEVEL_OUTOF10: 7

## 2025-01-12 ASSESSMENT — PAIN DESCRIPTION - DESCRIPTORS
DESCRIPTORS: SHARP
DESCRIPTORS: ACHING;DISCOMFORT
DESCRIPTORS: ACHING;DULL
DESCRIPTORS: ACHING
DESCRIPTORS: ACHING

## 2025-01-12 ASSESSMENT — PAIN DESCRIPTION - ORIENTATION
ORIENTATION: ANTERIOR
ORIENTATION: ANTERIOR

## 2025-01-12 NOTE — PLAN OF CARE
Problem: Pain  Goal: Verbalizes/displays adequate comfort level or baseline comfort level  Outcome: Progressing  Flowsheets (Taken 1/11/2025 2111 by Jaky Poole, RN)  Verbalizes/displays adequate comfort level or baseline comfort level:   Encourage patient to monitor pain and request assistance   Assess pain using appropriate pain scale     Problem: Safety - Adult  Goal: Free from fall injury  Outcome: Progressing  Flowsheets (Taken 1/11/2025 0900)  Free From Fall Injury:   Instruct family/caregiver on patient safety   Based on caregiver fall risk screen, instruct family/caregiver to ask for assistance with transferring infant if caregiver noted to have fall risk factors      Complex Repair And Dermal Autograft Text: The defect edges were debeveled with a #15 scalpel blade.  The primary defect was closed partially with a complex linear closure.  Given the location of the defect, shape of the defect and the proximity to free margins an dermal autograft was deemed most appropriate to repair the remaining defect.  The graft was trimmed to fit the size of the remaining defect.  The graft was then placed in the primary defect, oriented appropriately, and sutured into place.

## 2025-01-12 NOTE — PROGRESS NOTES
Hospitalist Progress Note  ALF Owens NP  Answering service: [phone] OR [phone] from in house phone        Date of Service:  2025  NAME:  Mikki Scott  :  1995  MRN:  [id number]      Admission Summary:   Per H&P    Mikki Scott is a 29 y.o. female PMH significant for Sjogren's syndrome presented to the ED with worsening abdominal pain.  She developed acute onset right midsection abdominal pain on  and saw GI on .  She had ultrasound and given some pain medications.  She was seen at [facility] ED on 2025 when CT A/P showed inflammatory process anterior to the mid right colon, small amount of free fluid in the right paracolic gutter and pelvis and she was discharged on Augmentin, Percocet and ibuprofen.  The pain seemed to be improving at the beginning but later moved to the left side and continues to worsen so she came to the ED today.  She denied nausea, vomiting, diarrhea.  She denies fever or chills.  CAT scan of the abdomen pelvis repeated today showed peritoneal or omental thickening and inflammation in the pelvis and mid abdomen and to a lesser extent adjacent to the right colon with small amount of free fluid that said to be increased compared to the same study on .     The patient denies any headache, blurry vision, sore throat, trouble swallowing, trouble with speech, chest pain, SOB, cough, fever, chills, N/V/D, abd pain, urinary symptoms, constipation, recent travels, sick contacts, focal or generalized neurological symptoms, falls, injuries, rashes, contact with COVID-19 diagnosed patients, hematemesis, melena, hemoptysis, hematuria, rashes, denies starting any new medications and denies any other concerns or problems besides as mentioned above.     Interval history / Subjective:     I saw the patient this morning on rounds.  With complaints of pain.  Oral Daily PRN    acetaminophen (TYLENOL) tablet 650 mg  650 mg Oral Q6H PRN    Or    acetaminophen (TYLENOL) suppository 650 mg  650 mg Rectal Q6H PRN    HYDROmorphone (DILAUDID) injection 1 mg  1 mg IntraVENous Q4H PRN    lactated ringers infusion   IntraVENous Continuous     ______________________________________________________________________  EXPECTED LENGTH OF STAY: Unable to retrieve estimated LOS  ACTUAL LENGTH OF STAY:          3                 ALF Owens - NP

## 2025-01-13 ENCOUNTER — APPOINTMENT (OUTPATIENT)
Facility: HOSPITAL | Age: 30
DRG: 372 | End: 2025-01-13
Payer: COMMERCIAL

## 2025-01-13 ENCOUNTER — ANESTHESIA EVENT (OUTPATIENT)
Facility: HOSPITAL | Age: 30
End: 2025-01-13
Payer: COMMERCIAL

## 2025-01-13 ENCOUNTER — ANESTHESIA (OUTPATIENT)
Facility: HOSPITAL | Age: 30
End: 2025-01-13
Payer: COMMERCIAL

## 2025-01-13 LAB
ALBUMIN SERPL-MCNC: 2.4 G/DL (ref 3.5–5)
ALBUMIN/GLOB SERPL: 0.6 (ref 1.1–2.2)
ALP SERPL-CCNC: 46 U/L (ref 45–117)
ALT SERPL-CCNC: 11 U/L (ref 12–78)
ANION GAP SERPL CALC-SCNC: 4 MMOL/L (ref 2–12)
AST SERPL-CCNC: 13 U/L (ref 15–37)
BASOPHILS # BLD: 0.02 K/UL (ref 0–0.1)
BASOPHILS NFR BLD: 0.5 % (ref 0–1)
BILIRUB SERPL-MCNC: <0.1 MG/DL (ref 0.2–1)
BUN SERPL-MCNC: 4 MG/DL (ref 6–20)
BUN/CREAT SERPL: 6 (ref 12–20)
CALCIUM SERPL-MCNC: 9 MG/DL (ref 8.5–10.1)
CHLORIDE SERPL-SCNC: 110 MMOL/L (ref 97–108)
CO2 SERPL-SCNC: 26 MMOL/L (ref 21–32)
CREAT SERPL-MCNC: 0.64 MG/DL (ref 0.55–1.02)
DIFFERENTIAL METHOD BLD: ABNORMAL
EOSINOPHIL # BLD: 0.36 K/UL (ref 0–0.4)
EOSINOPHIL NFR BLD: 8.4 % (ref 0–7)
ERYTHROCYTE [DISTWIDTH] IN BLOOD BY AUTOMATED COUNT: 13.1 % (ref 11.5–14.5)
GLOBULIN SER CALC-MCNC: 4.2 G/DL (ref 2–4)
GLUCOSE SERPL-MCNC: 84 MG/DL (ref 65–100)
HCT VFR BLD AUTO: 28.8 % (ref 35–47)
HGB BLD-MCNC: 9.4 G/DL (ref 11.5–16)
IMM GRANULOCYTES # BLD AUTO: 0.01 K/UL (ref 0–0.04)
IMM GRANULOCYTES NFR BLD AUTO: 0.2 % (ref 0–0.5)
LYMPHOCYTES # BLD: 1.52 K/UL (ref 0.8–3.5)
LYMPHOCYTES NFR BLD: 35.3 % (ref 12–49)
MCH RBC QN AUTO: 26.4 PG (ref 26–34)
MCHC RBC AUTO-ENTMCNC: 32.6 G/DL (ref 30–36.5)
MCV RBC AUTO: 80.9 FL (ref 80–99)
MONOCYTES # BLD: 0.34 K/UL (ref 0–1)
MONOCYTES NFR BLD: 7.9 % (ref 5–13)
NEUTS SEG # BLD: 2.06 K/UL (ref 1.8–8)
NEUTS SEG NFR BLD: 47.7 % (ref 32–75)
NRBC # BLD: 0 K/UL (ref 0–0.01)
NRBC BLD-RTO: 0 PER 100 WBC
PLATELET # BLD AUTO: 510 K/UL (ref 150–400)
PMV BLD AUTO: 10 FL (ref 8.9–12.9)
POTASSIUM SERPL-SCNC: 3.6 MMOL/L (ref 3.5–5.1)
PROT SERPL-MCNC: 6.6 G/DL (ref 6.4–8.2)
RBC # BLD AUTO: 3.56 M/UL (ref 3.8–5.2)
SODIUM SERPL-SCNC: 140 MMOL/L (ref 136–145)
WBC # BLD AUTO: 4.3 K/UL (ref 3.6–11)

## 2025-01-13 PROCEDURE — 1100000000 HC RM PRIVATE

## 2025-01-13 PROCEDURE — G0378 HOSPITAL OBSERVATION PER HR: HCPCS

## 2025-01-13 PROCEDURE — 6370000000 HC RX 637 (ALT 250 FOR IP)

## 2025-01-13 PROCEDURE — 85025 COMPLETE CBC W/AUTO DIFF WBC: CPT

## 2025-01-13 PROCEDURE — 6370000000 HC RX 637 (ALT 250 FOR IP): Performed by: NURSE PRACTITIONER

## 2025-01-13 PROCEDURE — 80053 COMPREHEN METABOLIC PANEL: CPT

## 2025-01-13 PROCEDURE — 74018 RADEX ABDOMEN 1 VIEW: CPT

## 2025-01-13 RX ADMIN — POLYETHYLENE GLYCOL-3350 AND ELECTROLYTES 4000 ML: 236; 6.74; 5.86; 2.97; 22.74 POWDER, FOR SOLUTION ORAL at 15:59

## 2025-01-13 RX ADMIN — HYDROMORPHONE HYDROCHLORIDE 2 MG: 2 TABLET ORAL at 10:46

## 2025-01-13 ASSESSMENT — PAIN DESCRIPTION - LOCATION: LOCATION: ABDOMEN

## 2025-01-13 ASSESSMENT — PAIN SCALES - GENERAL
PAINLEVEL_OUTOF10: 0
PAINLEVEL_OUTOF10: 6

## 2025-01-13 NOTE — PROGRESS NOTES
Hospitalist Progress Note  ALF Owens NP  Answering service: [phone] OR [phone] from in house phone        Date of Service:  2025  NAME:  Mikki Scott  :  1995  MRN:  [id number]      Admission Summary:   Per H&P    Mikki Scott is a 29 y.o. female PMH significant for Sjogren's syndrome presented to the ED with worsening abdominal pain.  She developed acute onset right midsection abdominal pain on  and saw GI on .  She had ultrasound and given some pain medications.  She was seen at [facility] ED on 2025 when CT A/P showed inflammatory process anterior to the mid right colon, small amount of free fluid in the right paracolic gutter and pelvis and she was discharged on Augmentin, Percocet and ibuprofen.  The pain seemed to be improving at the beginning but later moved to the left side and continues to worsen so she came to the ED today.  She denied nausea, vomiting, diarrhea.  She denies fever or chills.  CAT scan of the abdomen pelvis repeated today showed peritoneal or omental thickening and inflammation in the pelvis and mid abdomen and to a lesser extent adjacent to the right colon with small amount of free fluid that said to be increased compared to the same study on .     The patient denies any headache, blurry vision, sore throat, trouble swallowing, trouble with speech, chest pain, SOB, cough, fever, chills, N/V/D, abd pain, urinary symptoms, constipation, recent travels, sick contacts, focal or generalized neurological symptoms, falls, injuries, rashes, contact with COVID-19 diagnosed patients, hematemesis, melena, hemoptysis, hematuria, rashes, denies starting any new medications and denies any other concerns or problems besides as mentioned above.     Interval history / Subjective:       I saw the patient this morning on rounds.  Feeling better this  section of CPT  I personally reviewed  Image    I have independently reviewed and interpreted patient's lab and all other diagnostic data    Notes reviewed from all clinical/nonclinical/nursing services involved in patient's clinical care. Care coordination discussions were held with appropriate clinical/nonclinical/ nursing providers based on care coordination needs.     Labs:     Recent Labs     01/11/25 0140 01/12/25  0606   WBC 4.7 4.2   HGB 8.8* 9.2*   HCT 26.8* 28.3*   * 433*     Recent Labs     01/11/25 0140 01/12/25  0606    139   K 3.8 3.7    108   CO2 27 25   BUN 6 3*     Recent Labs     01/12/25  0606   ALT 11*   GLOB 3.8     No results for input(s): \"INR\", \"APTT\" in the last 72 hours.    Invalid input(s): \"PTP\"   No results for input(s): \"TIBC\" in the last 72 hours.    Invalid input(s): \"FE\", \"PSAT\", \"FERR\"   No results found for: \"RBCF\"   No results for input(s): \"PH\", \"PCO2\", \"PO2\" in the last 72 hours.  No results for input(s): \"CPK\" in the last 72 hours.    Invalid input(s): \"CPKMB\", \"CKNDX\", \"TROIQ\"  No results found for: \"CHOL\", \"CHLST\", \"CHOLV\", \"HDL\", \"HDLC\", \"LDL\", \"LDLC\"  No results found for: \"GLUCPOC\"        Medications Reviewed:     Current Facility-Administered Medications   Medication Dose Route Frequency    HYDROmorphone (DILAUDID) tablet 2 mg  2 mg Oral Q4H PRN    sodium chloride flush 0.9 % injection 5-40 mL  5-40 mL IntraVENous 2 times per day    sodium chloride flush 0.9 % injection 5-40 mL  5-40 mL IntraVENous PRN    0.9 % sodium chloride infusion   IntraVENous PRN    ondansetron (ZOFRAN-ODT) disintegrating tablet 4 mg  4 mg Oral Q8H PRN    Or    ondansetron (ZOFRAN) injection 4 mg  4 mg IntraVENous Q6H PRN    polyethylene glycol (GLYCOLAX) packet 17 g  17 g Oral Daily PRN    acetaminophen (TYLENOL) tablet 650 mg  650 mg Oral Q6H PRN    Or    acetaminophen (TYLENOL) suppository 650 mg  650 mg Rectal Q6H PRN    HYDROmorphone (DILAUDID) injection 1 mg  1 mg

## 2025-01-13 NOTE — PROGRESS NOTES
[facility]  [address]  [place] [address] [phone]                     GI PROGRESS NOTE    Patient Name: Mikki Scott      : 1995      MRN: [id number]  Admit Date: 2025  Today's Date: 2025  CC: abdominal pain, mesenteric pannicuitis vs IBD     Subjective:     Patient with improved abdominal pain. Describes continued discomfort on right side of abdomen. + Small BM this morning.     Objective:     Blood pressure 101/62, pulse 72, temperature 99 °F (37.2 °C), temperature source Oral, resp. rate 16, height 1.676 m (5' 6\"), weight 75.8 kg (167 lb 1.7 oz), last menstrual period 2024, SpO2 99%.    Physical Exam:  General appearance: cooperative, no distress, appears stated age  Skin: Extremities and face reveal no rashes.   HEENT: Sclerae anicteric. Extra-occular muscles are intact.   Cardiovascular: Regular rate and rhythm.    Respiratory: Comfortable breathing with no accessory muscle use.   GI: Abdomen nondistended, soft, and TTP on right abdomen. Normal active bowel sounds.   Rectal: Deferred   Musculoskeletal: No pitting edema of the lower legs.    Neurological: Gross memory appears intact. Patient is alert and oriented.   Psychiatric:   No anxiety or agitation.      Data Review:    Recent Results (from the past 24 hour(s))   CBC with Auto Differential    Collection Time: 25  6:58 AM   Result Value Ref Range    WBC 4.3 3.6 - 11.0 K/uL    RBC 3.56 (L) 3.80 - 5.20 M/uL    Hemoglobin 9.4 (L) 11.5 - 16.0 g/dL    Hematocrit 28.8 (L) 35.0 - 47.0 %    MCV 80.9 80.0 - 99.0 FL    MCH 26.4 26.0 - 34.0 PG    MCHC 32.6 30.0 - 36.5 g/dL    RDW 13.1 11.5 - 14.5 %    Platelets 510 (H) 150 - 400 K/uL    MPV 10.0 8.9 - 12.9 FL    Nucleated RBCs 0.0 0  WBC    nRBC 0.00 0.00 - 0.01 K/uL    Neutrophils % 47.7 32.0 - 75.0 %    Lymphocytes % 35.3 12.0 - 49.0 %    Monocytes % 7.9 5.0 - 13.0 %    Eosinophils % 8.4 (H) 0.0 - 7.0 %    Basophils % 0.5 0.0 - 1.0 %     Immature Granulocytes % 0.2 0.0 - 0.5 %    Neutrophils Absolute 2.06 1.80 - 8.00 K/UL    Lymphocytes Absolute 1.52 0.80 - 3.50 K/UL    Monocytes Absolute 0.34 0.00 - 1.00 K/UL    Eosinophils Absolute 0.36 0.00 - 0.40 K/UL    Basophils Absolute 0.02 0.00 - 0.10 K/UL    Immature Granulocytes Absolute 0.01 0.00 - 0.04 K/UL    Differential Type AUTOMATED     Comprehensive Metabolic Panel    Collection Time: 01/13/25  6:58 AM   Result Value Ref Range    Sodium 140 136 - 145 mmol/L    Potassium 3.6 3.5 - 5.1 mmol/L    Chloride 110 (H) 97 - 108 mmol/L    CO2 26 21 - 32 mmol/L    Anion Gap 4 2 - 12 mmol/L    Glucose 84 65 - 100 mg/dL    BUN 4 (L) 6 - 20 MG/DL    Creatinine 0.64 0.55 - 1.02 MG/DL    BUN/Creatinine Ratio 6 (L) 12 - 20      Est, Glom Filt Rate >90 >60 ml/min/1.73m2    Calcium 9.0 8.5 - 10.1 MG/DL    Total Bilirubin <0.1 (L) 0.2 - 1.0 MG/DL    ALT 11 (L) 12 - 78 U/L    AST 13 (L) 15 - 37 U/L    Alk Phosphatase 46 45 - 117 U/L    Total Protein 6.6 6.4 - 8.2 g/dL    Albumin 2.4 (L) 3.5 - 5.0 g/dL    Globulin 4.2 (H) 2.0 - 4.0 g/dL    Albumin/Globulin Ratio 0.6 (L) 1.1 - 2.2           Assessment / Plan :     30 yo AAF w/ pmh Sjogren's, IBS-C, GERD admitted for significant abdominal pain. Imaging shows possible mesenteric panniculitis. GI consulted for question of IBD.      She had EGD/Cscope in 2023 with that showed GERD.      - colonoscopy tomorrow   - supportive care  - consider bx with IR though yield may be low  - diet as tolerated from GI standpoint- NPO past midnight   - PPI  - GI following        Patient Active Hospital Problem List:   Principal Problem:    Peritonitis (HCC)  Active Problems:    Fever  Resolved Problems:    * No resolved hospital problems. *      Time Spent with Patient: 15    ALF Orr - NP

## 2025-01-13 NOTE — PLAN OF CARE
Problem: Pain  Goal: Verbalizes/displays adequate comfort level or baseline comfort level  1/12/2025 2319 by Jaky Poole RN  Outcome: Progressing  1/12/2025 1158 by Jane Patel LPN  Outcome: Progressing  Flowsheets (Taken 1/12/2025 1115)  Verbalizes/displays adequate comfort level or baseline comfort level:   Encourage patient to monitor pain and request assistance   Assess pain using appropriate pain scale

## 2025-01-14 LAB
ALBUMIN SERPL-MCNC: 2.8 G/DL (ref 3.5–5)
ALBUMIN/GLOB SERPL: 0.8 (ref 1.1–2.2)
ALP SERPL-CCNC: 50 U/L (ref 45–117)
ALT SERPL-CCNC: 15 U/L (ref 12–78)
ANION GAP SERPL CALC-SCNC: 8 MMOL/L (ref 2–12)
AST SERPL-CCNC: 14 U/L (ref 15–37)
BACTERIA SPEC CULT: NORMAL
BACTERIA SPEC CULT: NORMAL
BASOPHILS # BLD: 0.04 K/UL (ref 0–0.1)
BASOPHILS NFR BLD: 0.6 % (ref 0–1)
BILIRUB SERPL-MCNC: 0.2 MG/DL (ref 0.2–1)
BUN SERPL-MCNC: 4 MG/DL (ref 6–20)
BUN/CREAT SERPL: 6 (ref 12–20)
CALCIUM SERPL-MCNC: 9.1 MG/DL (ref 8.5–10.1)
CENTROMERE B AB SER-ACNC: <0.2 AI (ref 0–0.9)
CHLORIDE SERPL-SCNC: 109 MMOL/L (ref 97–108)
CHROMATIN AB SERPL-ACNC: <0.2 AI (ref 0–0.9)
CO2 SERPL-SCNC: 22 MMOL/L (ref 21–32)
CREAT SERPL-MCNC: 0.71 MG/DL (ref 0.55–1.02)
CRYPTOSP AG STL QL: NEGATIVE
DIFFERENTIAL METHOD BLD: ABNORMAL
DSDNA AB SER-ACNC: 1 IU/ML (ref 0–9)
ENA JO1 AB SER-ACNC: <0.2 AI (ref 0–0.9)
ENA RNP AB SER-ACNC: <0.2 AI (ref 0–0.9)
ENA SCL70 AB SER-ACNC: <0.2 AI (ref 0–0.9)
ENA SM AB SER-ACNC: <0.2 AI (ref 0–0.9)
ENA SS-A AB SER-ACNC: >8 AI (ref 0–0.9)
ENA SS-A AB SER-ACNC: >8 AI (ref 0–0.9)
ENA SS-B AB SER-ACNC: <0.2 AI (ref 0–0.9)
ENA SS-B AB SER-ACNC: <0.2 AI (ref 0–0.9)
EOSINOPHIL # BLD: 0.32 K/UL (ref 0–0.4)
EOSINOPHIL NFR BLD: 4.8 % (ref 0–7)
ERYTHROCYTE [DISTWIDTH] IN BLOOD BY AUTOMATED COUNT: 13.2 % (ref 11.5–14.5)
GLOBULIN SER CALC-MCNC: 3.7 G/DL (ref 2–4)
GLUCOSE SERPL-MCNC: 87 MG/DL (ref 65–100)
HCT VFR BLD AUTO: 28.7 % (ref 35–47)
HGB BLD-MCNC: 9.4 G/DL (ref 11.5–16)
IMM GRANULOCYTES # BLD AUTO: 0.02 K/UL (ref 0–0.04)
IMM GRANULOCYTES NFR BLD AUTO: 0.3 % (ref 0–0.5)
LYMPHOCYTES # BLD: 1.37 K/UL (ref 0.8–3.5)
LYMPHOCYTES NFR BLD: 20.7 % (ref 12–49)
Lab: ABNORMAL
MCH RBC QN AUTO: 26.3 PG (ref 26–34)
MCHC RBC AUTO-ENTMCNC: 32.8 G/DL (ref 30–36.5)
MCV RBC AUTO: 80.4 FL (ref 80–99)
MONOCYTES # BLD: 0.51 K/UL (ref 0–1)
MONOCYTES NFR BLD: 7.7 % (ref 5–13)
NEUTS SEG # BLD: 4.36 K/UL (ref 1.8–8)
NEUTS SEG NFR BLD: 65.9 % (ref 32–75)
NRBC # BLD: 0 K/UL (ref 0–0.01)
NRBC BLD-RTO: 0 PER 100 WBC
PLATELET # BLD AUTO: 515 K/UL (ref 150–400)
PMV BLD AUTO: 9.5 FL (ref 8.9–12.9)
POTASSIUM SERPL-SCNC: 3.9 MMOL/L (ref 3.5–5.1)
PROT SERPL-MCNC: 6.5 G/DL (ref 6.4–8.2)
RBC # BLD AUTO: 3.57 M/UL (ref 3.8–5.2)
SERVICE CMNT-IMP: NORMAL
SERVICE CMNT-IMP: NORMAL
SODIUM SERPL-SCNC: 139 MMOL/L (ref 136–145)
WBC # BLD AUTO: 6.6 K/UL (ref 3.6–11)

## 2025-01-14 PROCEDURE — 87328 CRYPTOSPORIDIUM AG IA: CPT

## 2025-01-14 PROCEDURE — 87329 GIARDIA AG IA: CPT

## 2025-01-14 PROCEDURE — 7100000010 HC PHASE II RECOVERY - FIRST 15 MIN: Performed by: INTERNAL MEDICINE

## 2025-01-14 PROCEDURE — 87506 IADNA-DNA/RNA PROBE TQ 6-11: CPT

## 2025-01-14 PROCEDURE — 3700000001 HC ADD 15 MINUTES (ANESTHESIA): Performed by: INTERNAL MEDICINE

## 2025-01-14 PROCEDURE — 85025 COMPLETE CBC W/AUTO DIFF WBC: CPT

## 2025-01-14 PROCEDURE — 2709999900 HC NON-CHARGEABLE SUPPLY: Performed by: INTERNAL MEDICINE

## 2025-01-14 PROCEDURE — 0DBK8ZX EXCISION OF ASCENDING COLON, VIA NATURAL OR ARTIFICIAL OPENING ENDOSCOPIC, DIAGNOSTIC: ICD-10-PCS | Performed by: INTERNAL MEDICINE

## 2025-01-14 PROCEDURE — 2580000003 HC RX 258: Performed by: HOSPITALIST

## 2025-01-14 PROCEDURE — 7100000011 HC PHASE II RECOVERY - ADDTL 15 MIN: Performed by: INTERNAL MEDICINE

## 2025-01-14 PROCEDURE — 1100000000 HC RM PRIVATE

## 2025-01-14 PROCEDURE — 6360000002 HC RX W HCPCS

## 2025-01-14 PROCEDURE — 3600007501: Performed by: INTERNAL MEDICINE

## 2025-01-14 PROCEDURE — 3600007511: Performed by: INTERNAL MEDICINE

## 2025-01-14 PROCEDURE — 3700000000 HC ANESTHESIA ATTENDED CARE: Performed by: INTERNAL MEDICINE

## 2025-01-14 PROCEDURE — 6370000000 HC RX 637 (ALT 250 FOR IP): Performed by: NURSE PRACTITIONER

## 2025-01-14 PROCEDURE — 80053 COMPREHEN METABOLIC PANEL: CPT

## 2025-01-14 PROCEDURE — G0378 HOSPITAL OBSERVATION PER HR: HCPCS

## 2025-01-14 PROCEDURE — 0DBM8ZX EXCISION OF DESCENDING COLON, VIA NATURAL OR ARTIFICIAL OPENING ENDOSCOPIC, DIAGNOSTIC: ICD-10-PCS | Performed by: INTERNAL MEDICINE

## 2025-01-14 PROCEDURE — 88305 TISSUE EXAM BY PATHOLOGIST: CPT

## 2025-01-14 RX ORDER — SODIUM CHLORIDE 9 MG/ML
INJECTION, SOLUTION INTRAVENOUS CONTINUOUS
Status: CANCELLED | OUTPATIENT
Start: 2025-01-14

## 2025-01-14 RX ORDER — SODIUM CHLORIDE 0.9 % (FLUSH) 0.9 %
5-40 SYRINGE (ML) INJECTION PRN
Status: CANCELLED | OUTPATIENT
Start: 2025-01-14

## 2025-01-14 RX ORDER — SODIUM CHLORIDE 0.9 % (FLUSH) 0.9 %
5-40 SYRINGE (ML) INJECTION EVERY 12 HOURS SCHEDULED
Status: CANCELLED | OUTPATIENT
Start: 2025-01-14

## 2025-01-14 RX ORDER — LIDOCAINE HYDROCHLORIDE 20 MG/ML
INJECTION, SOLUTION EPIDURAL; INFILTRATION; INTRACAUDAL; PERINEURAL
Status: DISCONTINUED | OUTPATIENT
Start: 2025-01-14 | End: 2025-01-14 | Stop reason: SDUPTHER

## 2025-01-14 RX ORDER — SODIUM CHLORIDE 9 MG/ML
INJECTION, SOLUTION INTRAVENOUS PRN
Status: CANCELLED | OUTPATIENT
Start: 2025-01-14

## 2025-01-14 RX ADMIN — PROPOFOL 100 MG: 10 INJECTION, EMULSION INTRAVENOUS at 17:17

## 2025-01-14 RX ADMIN — PROPOFOL 25 MG: 10 INJECTION, EMULSION INTRAVENOUS at 17:20

## 2025-01-14 RX ADMIN — PROPOFOL 50 MG: 10 INJECTION, EMULSION INTRAVENOUS at 17:29

## 2025-01-14 RX ADMIN — PROPOFOL 50 MG: 10 INJECTION, EMULSION INTRAVENOUS at 17:27

## 2025-01-14 RX ADMIN — HYDROMORPHONE HYDROCHLORIDE 2 MG: 2 TABLET ORAL at 11:06

## 2025-01-14 RX ADMIN — SODIUM CHLORIDE: 9 INJECTION, SOLUTION INTRAVENOUS at 17:04

## 2025-01-14 RX ADMIN — PROPOFOL 25 MG: 10 INJECTION, EMULSION INTRAVENOUS at 17:23

## 2025-01-14 RX ADMIN — HYDROMORPHONE HYDROCHLORIDE 2 MG: 2 TABLET ORAL at 20:25

## 2025-01-14 RX ADMIN — LIDOCAINE HYDROCHLORIDE 50 MG: 20 INJECTION, SOLUTION EPIDURAL; INFILTRATION; INTRACAUDAL; PERINEURAL at 17:17

## 2025-01-14 ASSESSMENT — PAIN SCALES - GENERAL
PAINLEVEL_OUTOF10: 5
PAINLEVEL_OUTOF10: 5

## 2025-01-14 NOTE — OP NOTE
[facility]  [facility]  ALIA Mckenzie Jr, MD  [phone]      2025    Colonoscopy Procedure Note  Mikki Scott  :  1995  [facility] Medical Record Number: [id number]    Indications:   Abdominal pain, abnormal CT scan of the GI tract  PCP:  Charlie Ibanez MD  Anesthesia/Sedation: See Anesthesia Record  Endoscopist:  Dr. ALIA Mckenzie Jr  Complications:  None  Estimated Blood Loss:  None    Surgical assistant: Circulator: Dolores rGajeda RN  Endoscopy Technician: Jennifer Klein none unless otherwise specified.     Permit:  The indications, risks, benefits and alternatives were reviewed with the patient or their decision maker who was provided an opportunity to ask questions and all questions were answered.  The specific risks of colonoscopy with conscious sedation were reviewed, including but not limited to anesthetic complication, bleeding, adverse drug reaction, missed lesion, infection, IV site reactions, and intestinal perforation which would lead to the need for surgical repair.  Alternatives to colonoscopy including radiographic imaging, observation without testing, or laboratory testing were reviewed including the limitations of those alternatives.  After considering the options and having all their questions answered, the patient or their decision maker provided both verbal and written consent to proceed.        Procedure in Detail:  After obtaining informed consent, positioning of the patient in the left lateral decubitus position, and conduction of a pre-procedure pause or \"time out\" the endoscope was introduced into the anus and advanced to the terminal ileum.  The quality of the colonic preparation was good.  A careful inspection was made as the colonoscope was withdrawn, findings and interventions are described below.    Findings:   Normal terminal ileum.

## 2025-01-14 NOTE — PROGRESS NOTES
Hospitalist Progress Note  ALF Lee NP  Answering service: [phone] OR [phone] from in house phone        Date of Service:  2025  NAME:  Mikki Scott  :  1995  MRN:  [id number]      Admission Summary:   Per H&P   Mikki Scott is a 29 y.o. female PMH significant for Sjogren's syndrome presented to the ED with worsening abdominal pain.  She developed acute onset right midsection abdominal pain on  and saw GI on .  She had ultrasound and given some pain medications.  She was seen at [facility] ED on 2025 when CT A/P showed inflammatory process anterior to the mid right colon, small amount of free fluid in the right paracolic gutter and pelvis and she was discharged on Augmentin, Percocet and ibuprofen.  The pain seemed to be improving at the beginning but later moved to the left side and continues to worsen so she came to the ED today.  She denied nausea, vomiting, diarrhea.  She denies fever or chills.  CAT scan of the abdomen pelvis repeated today showed peritoneal or omental thickening and inflammation in the pelvis and mid abdomen and to a lesser extent adjacent to the right colon with small amount of free fluid that said to be increased compared to the same study on .     Interval history / Subjective:      Patient seen and examined, lying in bed with significant other present. Feels that pain has somewhat subsided. Tolerated bowel prep and passing clear effluent.    Scheduled for colonoscopy this afternoon.    Requests to have ESR and CRP levels repeated.    Assessment & Plan:     Abdominal pain with CT A/P suggestive of mesenteric inflammation   DDx includes mesenteric panniculitis, peritonitis, suspect autoimmune given her history of Sjogren's disease less likely infectious  - GI following, planning for colonoscopy today  - RF normal, CESARIO and Sjogren's

## 2025-01-14 NOTE — PROGRESS NOTES
Comprehensive Nutrition Assessment    Type and Reason for Visit: Initial, NPO/clear liquid    Nutrition Recommendations/Plan:   NPO/CLD x 5 days, if unable to advance within 24-48 hours, consider nutrition support, consult prn  Monitor weight -- standing scale  Ensure Clear TID if on CLD  Lactose controlled diet     Malnutrition Assessment:  Malnutrition Status:  At risk for malnutrition (01/14/25 1513)    Context:  Acute Illness     Findings of the 6 clinical characteristics of malnutrition:  Energy Intake:  50% or less of estimated energy requirements for 5 or more days  Weight Loss:  No weight loss     Body Fat Loss:  No body fat loss     Muscle Mass Loss:  No muscle mass loss    Fluid Accumulation:  No fluid accumulation     Strength:  Not Performed     Nutrition Assessment:    30 yo female admitted for peritonitis, presented with abd pain. PMH of Sjorgen's syndrome. CAT scan of abd pelvis showed peritoneal or omental thickening and inflammation in the pelvis.     1/14: CATHLEEN eval for NPO/CLD x 5 days. Briefly saw pt 1/10 to confirm food preferences, see note from that date (lactose controlled diet). Pt to have colonoscopy today.   Spoke with pt at bedside who reports tolerating CLD with no N/V/D. Agreeable to adding Ensure Clear TID if advanced back to CLD. Pt has had poor PO intake for ~2 weeks now (1 week PTA). Was eating soup/broth/pedialyte at home. She reports a UBW of 165#, weight on admission was 167# via standing scale, recommend obtaining an updated weight. No muscle wasting/fat loss noted, pt appears well nourished. Pt at risk for malnutrition, if unable to advance diet within 24-48 hours, she may be indicated for nutrition support in form of PPN, consult prn. RD to continue to monitor for diet advancement.     Nutritionally Significant Medications:  Dilaudid prn    Estimated Daily Nutrient Needs:  Energy Requirements Based On: Kcal/kg  Weight Used for Energy Requirements: Current  Energy (kcal/day):

## 2025-01-14 NOTE — ANESTHESIA PRE PROCEDURE
Department of Anesthesiology  Preprocedure Note       Name:  Mikki Scott   Age:  29 y.o.  :  1995                                          MRN:  [id number]         Date:  2025      Surgeon: Surgeon(s):  Truman Mckenzie MD    Procedure: Procedure(s):  COLONOSCOPY DIAGNOSTIC    Medications prior to admission:   Prior to Admission medications    Medication Sig Start Date End Date Taking? Authorizing Provider   ondansetron (ZOFRAN-ODT) 4 MG disintegrating tablet Take 1 tablet by mouth 3 times daily as needed for Nausea or Vomiting 25   Hunter Villar MD   ibuprofen (ADVIL;MOTRIN) 800 MG tablet Take 1 tablet by mouth every 6 hours as needed for Pain 1/1/25 1/15/25  Hunter Villar MD   methylphenidate (CONCERTA) 18 MG extended release tablet Take 27 mg by mouth every morning. Max Daily Amount: 27 mg    Rosalee Kang MD   valGANciclovir (VALCYTE) 450 MG tablet Take 1,000 mg by mouth daily    Rosalee Kang MD   buPROPion (WELLBUTRIN XL) 150 MG extended release tablet Take 1 tablet by mouth every morning  Patient not taking: Reported on 2024   Rosalee Kang MD   escitalopram (LEXAPRO) 10 MG tablet Take 1 tablet by mouth daily  Patient not taking: Reported on 2024   Rosalee Kang MD   fluconazole (DIFLUCAN) 150 MG tablet Take 1 tablet by mouth every 3 days for a total of 2 doses  Patient not taking: Reported on 10/23/2023 4/25/23   Rosalee Kang MD   SUMAtriptan (IMITREX) 50 MG tablet Take 1 tablet by mouth as needed for Migraine 10/23/23   Jamin Rose MD       Current medications:    Current Facility-Administered Medications   Medication Dose Route Frequency Provider Last Rate Last Admin    HYDROmorphone (DILAUDID) tablet 2 mg  2 mg Oral Q4H PRN Jose Dwyer APRN - NP   2 mg at 25 1106    sodium chloride flush 0.9 % injection 5-40 mL  5-40 mL IntraVENous 2 times per day Jo-Ann Del Angel MD   10 mL at 25 1041

## 2025-01-14 NOTE — PROGRESS NOTES
Initial RN admission and assessment performed and documented in Endoscopy navigator.     Patient evaluated by anesthesia in pre-procedure holding.     All procedural vital signs, airway assessment, and level of consciousness information monitored and recorded by anesthesia staff on the anesthesia record.     Report received from CRNA post procedure.  Patient transported to recovery area by RN.    Endoscopy post procedure time out was performed and specimens were verified by physician.    Endoscope was pre-cleaned at bedside immediately following procedure by Jennifer.    TRANSFER - OUT REPORT:    Verbal report given to DAVE Boyd on Mikki Scott  being transferred to [facility] for routine post-op       Report consisted of patient's Situation, Background, Assessment and   Recommendations(SBAR).     Information from the following report(s) Nurse Handoff Report was reviewed with the receiving nurse.           Lines:   Peripheral IV 01/14/25 Right Antecubital (Active)        Opportunity for questions and clarification was provided.      Patient transported with:  Tech

## 2025-01-14 NOTE — PERIOP NOTE
TRANSFER - IN REPORT:    Verbal report received from 535 on Mikki Scott  being received from 535 for ordered procedure      Information from the following report(s) ED Encounter Summary, Adult Overview, MAR, Recent Results, Pre Procedure Checklist, Procedure Verification, and Quality Measures was reviewed with the receiving nurse.    Opportunity for questions and clarification was provided.

## 2025-01-14 NOTE — PROGRESS NOTES
Patient has not had an IV access for awhile, unsure how long. But least 36 hours. Previous day nurse attempted to place IV at shift change, unsuccessful.     Patient would rather wait till the morning for another attempt.

## 2025-01-15 PROBLEM — B96.89 OTH BACTERIAL AGENTS AS THE CAUSE OF DISEASES CLASSD ELSWHR: Status: ACTIVE | Noted: 2025-01-15

## 2025-01-15 LAB
C COLI+JEJUNI TUF STL QL NAA+PROBE: NEGATIVE
COMMENT:: NORMAL
CRP SERPL-MCNC: 2.22 MG/DL (ref 0–0.3)
EC STX1+STX2 GENES STL QL NAA+PROBE: NEGATIVE
ERYTHROCYTE [SEDIMENTATION RATE] IN BLOOD: 25 MM/HR (ref 0–20)
ETEC ELTA+ESTB GENES STL QL NAA+PROBE: NEGATIVE
P SHIGELLOIDES DNA STL QL NAA+PROBE: NEGATIVE
SALMONELLA SP SPAO STL QL NAA+PROBE: NEGATIVE
SHIGELLA SP+EIEC IPAH STL QL NAA+PROBE: NEGATIVE
SPECIMEN HOLD: NORMAL
V CHOL+PARA+VUL DNA STL QL NAA+NON-PROBE: NEGATIVE
Y ENTEROCOL DNA STL QL NAA+NON-PROBE: NEGATIVE

## 2025-01-15 PROCEDURE — 85652 RBC SED RATE AUTOMATED: CPT

## 2025-01-15 PROCEDURE — 86038 ANTINUCLEAR ANTIBODIES: CPT

## 2025-01-15 PROCEDURE — 99232 SBSQ HOSP IP/OBS MODERATE 35: CPT

## 2025-01-15 PROCEDURE — 86160 COMPLEMENT ANTIGEN: CPT

## 2025-01-15 PROCEDURE — 1100000000 HC RM PRIVATE

## 2025-01-15 PROCEDURE — 6370000000 HC RX 637 (ALT 250 FOR IP): Performed by: NURSE PRACTITIONER

## 2025-01-15 PROCEDURE — G0378 HOSPITAL OBSERVATION PER HR: HCPCS

## 2025-01-15 PROCEDURE — 36415 COLL VENOUS BLD VENIPUNCTURE: CPT

## 2025-01-15 PROCEDURE — 86140 C-REACTIVE PROTEIN: CPT

## 2025-01-15 RX ADMIN — HYDROMORPHONE HYDROCHLORIDE 2 MG: 2 TABLET ORAL at 17:20

## 2025-01-15 RX ADMIN — HYDROMORPHONE HYDROCHLORIDE 2 MG: 2 TABLET ORAL at 21:06

## 2025-01-15 ASSESSMENT — PAIN SCALES - GENERAL
PAINLEVEL_OUTOF10: 5
PAINLEVEL_OUTOF10: 7
PAINLEVEL_OUTOF10: 2

## 2025-01-15 ASSESSMENT — PAIN DESCRIPTION - ORIENTATION
ORIENTATION: LEFT
ORIENTATION: LEFT

## 2025-01-15 ASSESSMENT — PAIN DESCRIPTION - LOCATION: LOCATION: ABDOMEN

## 2025-01-15 ASSESSMENT — PAIN DESCRIPTION - DESCRIPTORS
DESCRIPTORS: SHARP
DESCRIPTORS: SHARP

## 2025-01-15 NOTE — PROGRESS NOTES
Infectious Disease Progress Note    Assessment / Plan:       28 y/o female with worsening peritonitis and fever.    Peritonitis  Fever - resolved  Blood cx 1/9/25 NGTD  Giardia, cryptosporidium negative  S/p colonoscopy 1/14, biopsy pending    Afebrile, CRP trending down. Monitor closely off antibiotics. No obvious infectious process.     Follow colonoscopy biopsy. If biopsy unremarkable , may need omental biopsy    Follow enteric panel    Will follow intermittently pending above.    D/w Dr. Escobar, pt         Reason for Consult: Peritonitis  Date of Consultation: January 15, 2025  Date of Admission: 1/9/2025  Referring Physician: Dr. Del Angel    HPI:    28 y/o female with relatively quiescent Sjogren's disease not on treatment currently otherwise healthy seen with boyfriend OK per patient's permission for persistent abdominal pain.  Was in usual state of health until 12/30/24 when pain started, migrating around abdomen.  Did have ruptured ovarian cyst prior to onset of symptoms and was taking ++ ibuprofen for pain relief.  Seen @ [facility] and treated with PO pain medications and Augmentin after CT noted inflammatory process anterior to right mid colon with right colonic thickening.      Symptoms did not resolve with antibiotic Rx and pain control and admitted for non-resolving symptoms.  CT with worsening of peritonitis.    Currently not trying to conceive.  No known family history of IBD though mother with \"GI issues\".  HIV negative 1/2023.  Prior colonoscopy due to GI issues without noted finding though EGD with gastritis.    Interval events:    Abdominal pain some improved, continued pain on left and suprapubic area.   Reports omental biopsy tomorrow    Past Medical History:  Past Medical History:   Diagnosis Date    Sjogren's disease (HCC)          Surgical History:  Past Surgical History:   Procedure Laterality Date    COLONOSCOPY N/A 1/14/2025    COLONOSCOPY DIAGNOSTIC performed by Truman Mckenzie MD at [facility]    C-Reactive Protein    Collection Time: 01/15/25  3:10 AM   Result Value Ref Range    CRP 2.22 (H) 0.00 - 0.30 mg/dL       Microbiology Data:       Blood: NGTD    Imaging: CT A/P with omental and peritoneal thickening without colonic wall changes    Thank you Dr. Del Angel for the opportunity to participate in the care of this patient. Please contact with questions or concerns.     A total time of 35 minutes was spent on today's encounter.  Greater than 50% of the time was spent on the following:  Preparing for visit and chart review.  Obtaining and/or reviewing separately obtained history  Performing a medically appropriate exam and/or evaluation  Counseling and educating a patient/family/caregiver as noted above  Placing relevant orders  Referring and communicating with other professionals (not separately reported)  Independently interpreting results (not separately reported) and communicating results to the patient/family/caregiver  Care coordination (not separately reported) as noted above  Documenting clinical information in the electronic health records (e.g. problem list, visit note) on the day of the encounter

## 2025-01-15 NOTE — PROGRESS NOTES
Per Cdiff collection protocol, sample does not meet criteria due to pt having received bowl prep less than 48 hrs from order.

## 2025-01-15 NOTE — CONSULTS
INTERVENTIONAL RADIOLOGY  Consult Note      Patient:  Mikki Scott  :  1995  Age:  29 y.o.  MRN:  [id number]    Today's Date:  1/15/2025  Admission Date:  2025  Hospital Day:  6  Consult requested by:  Pippa Gunter NP      Procedure requested:  Biopsy of omentum  Reason for procedure:  Worsening omental inflammation/peritonitis    Labs  Lab Results   Component Value Date/Time    WBC 6.6 2025 01:42 AM    HGB 9.4 2025 01:42 AM    HCT 28.7 2025 01:42 AM     2025 01:42 AM    MCV 80.4 2025 01:42 AM     Lab Results   Component Value Date/Time     2025 01:42 AM    K 3.9 2025 01:42 AM     2025 01:42 AM    CO2 22 2025 01:42 AM    BUN 4 2025 01:42 AM    GFRAA >60 2022 08:29 AM     No results found for: \"INR\", \"PT1\"    Medications reviewed  I personally reviewed the following imaging studies:  CT Result (most recent):  CT ABDOMEN PELVIS W IV CONTRAST 2025    Addendum 2025  4:04 PM  Addendum: Differential also includes acute exacerbation of inflammatory bowel  disease (Crohn's disease)    Electronically signed by KAVON DAVIDSON    Narrative  EXAM: CT ABDOMEN PELVIS W IV CONTRAST    INDICATION: LLQ pain    COMPARISON: CT abdomen pelvis 2025    CONTRAST: 100 mL of Isovue-370.    ORAL CONTRAST: None    TECHNIQUE:  Following intravenous administration of contrast, thin axial images were  obtained through the abdomen and pelvis. Coronal and sagittal reconstructions  were generated. CT dose reduction was achieved through use of a standardized  protocol tailored for this examination and automatic exposure control for dose  modulation.    FINDINGS:  LOWER THORAX: No significant abnormality in the incidentally imaged lower chest.  LIVER: No mass.  BILIARY TREE: Gallbladder is within normal limits. CBD is not dilated.  SPLEEN: within normal limits.  PANCREAS: No mass or ductal dilatation.  ADRENALS:  Unremarkable.  KIDNEYS: No mass, calculus, or hydronephrosis.  STOMACH: Unremarkable.  SMALL BOWEL: No dilatation or wall thickening.  COLON: No dilatation or wall thickening.  APPENDIX: Unremarkable.  PERITONEUM: Peritoneal and omental thickening and inflammation in the pelvis and  mid abdomen, and to a lesser extent adjacent to the right colon. Small amount of  free fluid. This is increased compared to January 1  RETROPERITONEUM: No lymphadenopathy or aortic aneurysm.  REPRODUCTIVE ORGANS: 1.9 cm right ovarian cyst, it is within normal limits for  patient's age.  URINARY BLADDER: No mass or calculus.  BONES: No destructive bone lesion.  ABDOMINAL WALL: No mass or hernia.  ADDITIONAL COMMENTS: N/A    Impression  1. Interval increase in extent of the previously identified omental  inflammation/peritonitis, now involving the anterior pelvis and mid abdomen.  Overall appearance is nonspecific, broad differential includes infectious and  inflammatory processes, as well as autoimmune processes such as mesenteric  panniculitis    Electronically signed by KAVON CALLAHAN        Assessment / Plan  Procedure to be performed:   CT guided omental biopsy tentatively scheduled on 1/16/2025  Plan for sedation:  moderate  Post procedure plan:  observation per protocol  Code status:  Full Code       Case discussed with Kavon Callahan MD.    Thank you for asking us to participate in the care of this patient.      VAN Rucker  [facility]      CC:  Pippa Gunter NP

## 2025-01-15 NOTE — CARE COORDINATION
Transition of Care Plan:    RUR: 8%   Prior Level of Functioning: Independent   Disposition: Home   ANDREAS: 24-48hrs.   Follow up appointments: PCP   DME needed: None   IM/IMM Medicare/ letter given: N/A    Emergency Contact:   Oleg Bell (Domestic Partner) [phone]  Discharge Caregiver contacted prior to discharge? N/A   Care Conference needed? N/A   Barriers to discharge: Medical, biopsy pending    Following: GI, ID  Procedure: S/P Colonoscopy 1/14   Admitting DX: Peritonitis (HCC)

## 2025-01-15 NOTE — PROGRESS NOTES
Hospitalist Progress Note  ALF Lee NP  Answering service: [phone] OR [phone] from in house phone        Date of Service:  1/15/2025  NAME:  Mikki Scott  :  1995  MRN:  [id number]      Admission Summary:   Per H&P   Mikki Scott is a 29 y.o. female PMH significant for Sjogren's syndrome presented to the ED with worsening abdominal pain.  She developed acute onset right midsection abdominal pain on  and saw GI on .  She had ultrasound and given some pain medications.  She was seen at [facility] ED on 2025 when CT A/P showed inflammatory process anterior to the mid right colon, small amount of free fluid in the right paracolic gutter and pelvis and she was discharged on Augmentin, Percocet and ibuprofen.  The pain seemed to be improving at the beginning but later moved to the left side and continues to worsen so she came to the ED today.  She denied nausea, vomiting, diarrhea.  She denies fever or chills.  CAT scan of the abdomen pelvis repeated today showed peritoneal or omental thickening and inflammation in the pelvis and mid abdomen and to a lesser extent adjacent to the right colon with small amount of free fluid that said to be increased compared to the same study on .     Interval history / Subjective:      Patient seen and examined, lying in bed. Symptoms overall improved. Tolerating diet.     Plans in place for omental biopsy tomorrow.     Discussed with GI - patient will need close follow-up with rheumatology, consider adding steroids.     Spoke with patient. She now has follow-up with rheumatology (Dr. Cristela Connell) on . She prefers to wait to start prednisone until after biopsy has been completed.    Assessment & Plan:     Abdominal pain with CT A/P suggestive of mesenteric inflammation   DDx includes mesenteric panniculitis, peritonitis, suspect  Dose Route Frequency    HYDROmorphone (DILAUDID) tablet 2 mg  2 mg Oral Q4H PRN    sodium chloride flush 0.9 % injection 5-40 mL  5-40 mL IntraVENous 2 times per day    sodium chloride flush 0.9 % injection 5-40 mL  5-40 mL IntraVENous PRN    0.9 % sodium chloride infusion   IntraVENous PRN    ondansetron (ZOFRAN-ODT) disintegrating tablet 4 mg  4 mg Oral Q8H PRN    Or    ondansetron (ZOFRAN) injection 4 mg  4 mg IntraVENous Q6H PRN    polyethylene glycol (GLYCOLAX) packet 17 g  17 g Oral Daily PRN    acetaminophen (TYLENOL) tablet 650 mg  650 mg Oral Q6H PRN    Or    acetaminophen (TYLENOL) suppository 650 mg  650 mg Rectal Q6H PRN    HYDROmorphone (DILAUDID) injection 1 mg  1 mg IntraVENous Q4H PRN     ______________________________________________________________________  EXPECTED LENGTH OF STAY: Unable to retrieve estimated LOS  ACTUAL LENGTH OF STAY:          6                 ALF Lee NP

## 2025-01-15 NOTE — PROGRESS NOTES
[facility]  [address]  [place], [place] [address] [phone]                     GI PROGRESS NOTE    Patient Name: Mikki Scott      : 1995      MRN: [id number]  Admit Date: 2025  Today's Date: 1/15/2025  CC: abdominal pain, mesenteric pannicuitis vs IBD     Subjective:     Patient with improved abdominal pain. Tolerating diet.     Objective:     Blood pressure 99/68, pulse 78, temperature 97.7 °F (36.5 °C), temperature source Axillary, resp. rate 12, height 1.676 m (5' 6\"), weight 75.8 kg (167 lb 1.7 oz), last menstrual period 2024, SpO2 98%.    Physical Exam:  General appearance: cooperative, no distress, appears stated age  Skin: Extremities and face reveal no rashes.   HEENT: Sclerae anicteric. Extra-occular muscles are intact.   Cardiovascular: Regular rate and rhythm.    Respiratory: Comfortable breathing with no accessory muscle use.   GI: Abdomen nondistended, soft, and TTP on right abdomen. Normal active bowel sounds.   Rectal: Deferred   Musculoskeletal: No pitting edema of the lower legs.    Neurological: Gross memory appears intact. Patient is alert and oriented.   Psychiatric:   No anxiety or agitation.      Data Review:    Recent Results (from the past 24 hour(s))   Cryptosporidium Antigen, Stool    Collection Time: 25  7:52 PM   Result Value Ref Range    Cryptosporidium Antigen Negative     Sedimentation Rate    Collection Time: 01/15/25  3:10 AM   Result Value Ref Range    Sed Rate, Automated 25 (H) 0 - 20 mm/hr   C-Reactive Protein    Collection Time: 01/15/25  3:10 AM   Result Value Ref Range    CRP 2.22 (H) 0.00 - 0.30 mg/dL         Assessment / Plan :     28 yo AAF w/ pmh Sjogren's, IBS-C, GERD admitted for significant abdominal pain. Imaging shows possible mesenteric panniculitis. GI consulted for question of IBD.      She had EGD/Cscope in  with that showed GERD.     Colonoscopy 25  Findings:   Normal terminal ileum.  Normal  cecum and ascending colon.  Biopsies taken from normal-appearing right colon.  In the transverse colon and descending colon there were abnormal white mucosal exudates almost consistent with pseudomembranes which could be washed away.  There was not significant underlying inflammatory changes or intervening inflammatory changes.  Specifically there were no typical features of Crohn's or ulcerative colitis.  Biopsies were taken from these areas including the exudates.  There was no diverticulosis.  Normal sigmoid.  Normal rectum.     - Await biopsy results   - IR biopsy tomorrow  - OP f/up with rheumatology   - supportive care  - PPI    GI will sign off at this time. She has OV scheduled 2/6/25       Patient Active Hospital Problem List:   Principal Problem:    Peritonitis (HCC)  Active Problems:    Fever    Oth bacterial agents as the cause of diseases classd elswhr  Resolved Problems:    * No resolved hospital problems. *      Time Spent with Patient: 15    ALF Orr - NP

## 2025-01-16 ENCOUNTER — APPOINTMENT (OUTPATIENT)
Facility: HOSPITAL | Age: 30
DRG: 372 | End: 2025-01-16
Payer: COMMERCIAL

## 2025-01-16 VITALS
WEIGHT: 167.11 LBS | TEMPERATURE: 98.1 F | HEIGHT: 66 IN | BODY MASS INDEX: 26.86 KG/M2 | OXYGEN SATURATION: 99 % | SYSTOLIC BLOOD PRESSURE: 104 MMHG | DIASTOLIC BLOOD PRESSURE: 63 MMHG | HEART RATE: 87 BPM | RESPIRATION RATE: 18 BRPM

## 2025-01-16 LAB
G LAMBLIA AG STL QL IA: NEGATIVE
SPECIMEN SOURCE: NORMAL

## 2025-01-16 PROCEDURE — G0378 HOSPITAL OBSERVATION PER HR: HCPCS

## 2025-01-16 PROCEDURE — 6370000000 HC RX 637 (ALT 250 FOR IP)

## 2025-01-16 PROCEDURE — 6370000000 HC RX 637 (ALT 250 FOR IP): Performed by: NURSE PRACTITIONER

## 2025-01-16 RX ORDER — PREDNISONE 10 MG/1
TABLET ORAL
Qty: 13 TABLET | Refills: 0 | Status: SHIPPED | OUTPATIENT
Start: 2025-01-16 | End: 2025-01-27

## 2025-01-16 RX ORDER — PREDNISONE 20 MG/1
20 TABLET ORAL ONCE
Status: COMPLETED | OUTPATIENT
Start: 2025-01-16 | End: 2025-01-16

## 2025-01-16 RX ORDER — HYDROMORPHONE HYDROCHLORIDE 2 MG/1
2 TABLET ORAL EVERY 6 HOURS PRN
Qty: 12 TABLET | Refills: 0 | Status: SHIPPED | OUTPATIENT
Start: 2025-01-16 | End: 2025-01-18

## 2025-01-16 RX ORDER — PANTOPRAZOLE SODIUM 40 MG/1
40 TABLET, DELAYED RELEASE ORAL
Qty: 30 TABLET | Refills: 1 | Status: SHIPPED | OUTPATIENT
Start: 2025-01-16

## 2025-01-16 RX ADMIN — PREDNISONE 20 MG: 20 TABLET ORAL at 10:48

## 2025-01-16 RX ADMIN — HYDROMORPHONE HYDROCHLORIDE 2 MG: 2 TABLET ORAL at 11:03

## 2025-01-16 ASSESSMENT — PAIN DESCRIPTION - DESCRIPTORS: DESCRIPTORS: ACHING

## 2025-01-16 ASSESSMENT — PAIN SCALES - GENERAL: PAINLEVEL_OUTOF10: 7

## 2025-01-16 ASSESSMENT — PAIN DESCRIPTION - LOCATION: LOCATION: ABDOMEN

## 2025-01-16 NOTE — DISCHARGE SUMMARY
Discharge Summary       PATIENT ID: Mikki Scott  MRN: [id number]   YOB: 1995    DATE OF ADMISSION: 1/9/2025  8:20 AM    DATE OF DISCHARGE: 1/16/2025   PRIMARY CARE PROVIDER: Charlie Ibanez MD     ATTENDING PHYSICIAN: Dr. Talley  DISCHARGING PROVIDER: ALF Lee NP    To contact this individual call [phone] and ask the  to page.  If unavailable ask to be transferred the Adult Hospitalist Department.    CONSULTATIONS: IP CONSULT TO HOSPITALIST  IP CONSULT TO GENERAL SURGERY  IP CONSULT TO INFECTIOUS DISEASES  IP CONSULT TO GI  IP CONSULT TO INTERVENTIONAL RADIOLOGY    PROCEDURES/SURGERIES: Procedure(s):  COLONOSCOPY DIAGNOSTIC     ADMITTING DIAGNOSES & HOSPITAL COURSE:   Per H&P 1/9  Mikki Scott is a 29 y.o. female PMH significant for Sjogren's syndrome presented to the ED with worsening abdominal pain.  She developed acute onset right midsection abdominal pain on December 30 and saw GI on December 31.  She had ultrasound and given some pain medications.  She was seen at [facility] ED on 1/1/2025 when CT A/P showed inflammatory process anterior to the mid right colon, small amount of free fluid in the right paracolic gutter and pelvis and she was discharged on Augmentin, Percocet and ibuprofen.  The pain seemed to be improving at the beginning but later moved to the left side and continues to worsen so she came to the ED today.  She denied nausea, vomiting, diarrhea.  She denies fever or chills.  CAT scan of the abdomen pelvis repeated today showed peritoneal or omental thickening and inflammation in the pelvis and mid abdomen and to a lesser extent adjacent to the right colon with small amount of free fluid that said to be increased compared to the same study on January 1.    1/16: Patient scheduled for omental biopsy. Imaging reviewed by IR with decision made to defer procedure and continue with medical management/follow up abdomen/pelvis CT in several weeks.  [address]  [address]  [facility] [address]  [phone]      Cristela Connell MD Internal Medicine Follow up Go to your scheduled appointment on 1/28. [address]   [address]  [facility] [address] [phone]      Yane Monteiro APRN - NP Nurse Practitioner Follow up Go to your scheduled follow up on 2/6. [address]  [address]  [facility] [id number] [phone]                ADDITIONAL CARE RECOMMENDATIONS:   Schedule follow up appointments as listed above.  You are being discharged with a prescription for a prednisone taper - take the full course as prescribed.  You are being discharged with a prescription for hydromorphone (pain medication). Take this as prescribed. Do not drive or drink alcohol while taking this medication.  If you develop chest pain, shortness of breath, severe abdominal pain, or inability to tolerate food/drink, you should be evaluated in the emergency department.     DIET: regular diet    ACTIVITY: activity as tolerated    WOUND CARE: none    EQUIPMENT needed: none      DISCHARGE MEDICATIONS:     Medication List        START taking these medications      HYDROmorphone 2 MG tablet  Commonly known as: DILAUDID  Take 1 tablet by mouth every 6 hours as needed for Pain for up to 3 days. Max Daily Amount: 8 mg     pantoprazole 40 MG tablet  Commonly known as: PROTONIX  Take 1 tablet by mouth every morning (before breakfast)     predniSONE 10 MG tablet  Commonly known as: DELTASONE  Take 2 tablets by mouth daily for 2 days, THEN 1.5 tablets daily for 3 days, THEN 1 tablet daily for 3 days, THEN 0.5 tablets daily for 3 days.  Start taking on: January 16, 2025            CONTINUE taking these medications      methylphenidate 18 MG extended release tablet  Commonly known as: CONCERTA     ondansetron 4 MG disintegrating tablet  Commonly known as: ZOFRAN-ODT  Take 1 tablet by mouth 3 times daily as needed for Nausea or Vomiting     SUMAtriptan 50 MG tablet  Commonly known as: IMITREX  Take 1

## 2025-01-16 NOTE — DISCHARGE INSTRUCTIONS
Discharge Instructions       PATIENT ID: Mikki Scott  MRN: [id number]   YOB: 1995    DATE OF ADMISSION: 1/9/2025   DATE OF DISCHARGE: 1/16/2025    PRIMARY CARE PROVIDER: Charlie Ibanez     ATTENDING PHYSICIAN: Yony Talley MD   DISCHARGING PROVIDER: ALF Lee NP    To contact this individual call [phone] and ask the  to page.   If unavailable ask to be transferred the Adult Hospitalist Department.    DISCHARGE DIAGNOSES abdominal pain, mesenteric inflammation    CONSULTATIONS: General Surgery, Infectious Disease, GI, IR    PROCEDURES/SURGERIES: Procedure(s):  COLONOSCOPY DIAGNOSTIC    PENDING TEST RESULTS:   At the time of discharge the following test results are still pending: C3 complement, C4 complement, anti-nuclear Ab, Giardia    FOLLOW UP APPOINTMENTS:    Follow-up Information       Follow up With Specialties Details Why Contact Info    Charlie Ibanez MD Internal Medicine Follow up Schedule follow up in 1 week. [address]  [address]  [facility] [id number] [phone]      Cristela Connell MD Internal Medicine Follow up Go to your scheduled appointment on 1/28. [address] [facility]  [address]  [facility] [address]  [phone]      Yane Monteiro APRN - NP Nurse Practitioner Follow up Go to your scheduled follow up on 2/6. [address] [facility] [facility]  [address]  [facility] [id number] [phone]              ADDITIONAL CARE RECOMMENDATIONS:   Schedule follow up appointments as listed above.  You are being discharged with a prescription for a prednisone taper - take the full course as prescribed.  You are being discharged with a prescription for hydromorphone (pain medication). Take this as prescribed. Do not drive or drink alcohol while taking this medication.  If you develop chest pain, shortness of breath, severe abdominal pain, or inability to tolerate food/drink, you should be evaluated in the emergency department.     DIET: regular

## 2025-01-16 NOTE — PROGRESS NOTES
Pt arrives via stretcher to angio department accompanied by transport for CT guided omental biopsy procedure. All assessments completed and consent was reviewed.  Education given was regarding procedure, moderate sedation, post-procedure care and  management/follow-up. Opportunity for questions was provided and all questions and concerns were addressed.

## 2025-01-16 NOTE — PROGRESS NOTES
Comprehensive Nutrition Assessment    Type and Reason for Visit: Reassess    Nutrition Recommendations/Plan:   Continue Regular diet as tolerated  Lactose controlled diet  Monitor weight -- standing scale  Please record %PO intake in I/Os     Malnutrition Assessment:  Malnutrition Status:  At risk for malnutrition (01/14/25 1513)    Context:  Acute Illness     Findings of the 6 clinical characteristics of malnutrition:  Energy Intake:  50% or less of estimated energy requirements for 5 or more days  Weight Loss:  No weight loss     Body Fat Loss:  No body fat loss     Muscle Mass Loss:  No muscle mass loss    Fluid Accumulation:  No fluid accumulation     Strength:  Not Performed     Nutrition Assessment:    28 yo female admitted for peritonitis, presented with abd pain. PMH of Sjorgen's syndrome. CAT scan of abd pelvis showed peritoneal or omental thickening and inflammation in the pelvis.     1/16: f/u. Diet advanced to Regular yesterday, pt NPO today for omental bx. S/p colonoscopy 1/14. Spoke with pt at bedside, she reports tolerating the Regular diet without any N/V/D. Was able to eat ~75% of her meals. Offered ONS/Snacks, she declined as she expects to go home today. Will f/u while IP.     1/14: RD eval for NPO/CLD x 5 days. Briefly saw pt 1/10 to confirm food preferences, see note from that date (lactose controlled diet). Pt to have colonoscopy today.   Spoke with pt at bedside who reports tolerating CLD with no N/V/D. Agreeable to adding Ensure Clear TID if advanced back to CLD. Pt has had poor PO intake for ~2 weeks now (1 week PTA). Was eating soup/broth/pedialyte at home. She reports a UBW of 165#, weight on admission was 167# via standing scale, recommend obtaining an updated weight. No muscle wasting/fat loss noted, pt appears well nourished. Pt at risk for malnutrition, if unable to advance diet within 24-48 hours, she may be indicated for nutrition support in form of PPN, consult prn. RD to

## 2025-01-16 NOTE — H&P
in the Last Year: Patient refused       FAMILY HISTORY  Family History   Problem Relation Age of Onset    Hypertension Mother     Mult Sclerosis Mother     Hypertension Sister     Dementia Maternal Grandmother     Colon Cancer Maternal Grandmother        CURRENT MEDICATIONS  Current Facility-Administered Medications   Medication Dose Route Frequency Provider Last Rate Last Admin    HYDROmorphone (DILAUDID) tablet 2 mg  2 mg Oral Q4H PRN Jose Dwyer APRN - NP   2 mg at 01/15/25 2106    sodium chloride flush 0.9 % injection 5-40 mL  5-40 mL IntraVENous 2 times per day Jo-Ann Del Angel MD   10 mL at 01/11/25 1041    sodium chloride flush 0.9 % injection 5-40 mL  5-40 mL IntraVENous PRN Jo-Ann Del Angel MD   10 mL at 01/10/25 1722    0.9 % sodium chloride infusion   IntraVENous PRN Jo-Ann Del Angel MD   New Bag at 01/14/25 1704    ondansetron (ZOFRAN-ODT) disintegrating tablet 4 mg  4 mg Oral Q8H PRN Jo-Ann Del Angel MD        Or    ondansetron (ZOFRAN) injection 4 mg  4 mg IntraVENous Q6H PRN Jo-Ann Del Angel MD        polyethylene glycol (GLYCOLAX) packet 17 g  17 g Oral Daily PRN Jo-Ann Del Angel MD        acetaminophen (TYLENOL) tablet 650 mg  650 mg Oral Q6H PRN Jo-Ann Del Angel MD   650 mg at 01/12/25 0950    Or    acetaminophen (TYLENOL) suppository 650 mg  650 mg Rectal Q6H PRN Jo-Ann Del Angel MD        HYDROmorphone (DILAUDID) injection 1 mg  1 mg IntraVENous Q4H PRN Jo-Ann Del Angel MD   1 mg at 01/11/25 0222       ALLERGIES  No Known Allergies    DIAGNOSTIC STUDIES   IMAGING STUDIES  Relevant Imaging studies reviewed:  CT Result (most recent):  CT ABDOMEN PELVIS W IV CONTRAST 01/09/2025    Addendum 1/9/2025  4:04 PM  Addendum: Differential also includes acute exacerbation of inflammatory bowel  disease (Crohn's disease)    Electronically signed by KAVON DAVIDSON    Narrative  EXAM: CT ABDOMEN PELVIS W IV CONTRAST    INDICATION: LLQ pain    COMPARISON: CT abdomen pelvis January 1,  01:42 AM     Lab Results   Component Value Date/Time     01/14/2025 01:42 AM    K 3.9 01/14/2025 01:42 AM     01/14/2025 01:42 AM    CO2 22 01/14/2025 01:42 AM    BUN 4 01/14/2025 01:42 AM    GFRAA >60 06/07/2022 08:29 AM     No results found for: \"INR\", \"PT1\"    PHYSICAL EXAM     Vitals:    01/16/25 0840   BP: (!) 146/125   Pulse: 87   Resp: 16   Temp: 98.1 °F (36.7 °C)   SpO2: 97%        General:  NAD  Heart:  RRR  Lungs:  NWOB  Neurological:  AAOX3    PLAN   Prior imaging reviewed. Omental inflammation present however we do not recommend a CT guided omental biopsy at this time.   Recommend medical management and follow up CT abdomen and pelvis to further evaluate.   Discussed with patient who expressed understanding. Also discussed with MARIELA Lee  Please call IR with any questions or concerns.       Case discussed with Pauly Olson MD who agrees with the assessment and plan for this patient.     VAN Rucker  [facility]    Cc: MARIELA Lee

## 2025-01-17 LAB
C3 SERPL-MCNC: 203 MG/DL (ref 82–167)
C4 SERPL-MCNC: 55 MG/DL (ref 12–38)

## 2025-01-17 NOTE — PROGRESS NOTES
Physician Progress Note      PATIENT:               GABY BUCKLEY  [facility] #:                  [id number]  :                       1995  ADMIT DATE:       2025 8:20 AM  DISCH DATE:        2025 12:20 PM  RESPONDING  PROVIDER #:        Pippa Gunter          QUERY TEXT:    The patient was admitted with peritonitis. The patient had elevated   temperature of 100.9 and on admission  CRP was 9.37    If possible, please document in progress notes and discharge summary if you   are evaluating and/or treating any of the following:  The medical record reflects the following:      Risk Factors: has peritonitis, hx. Sjogren?s disease    Clinical Indicators: -crp 9.37, 1/15-CRP 2.22  -Per PN- Abdominal pain with CT A/P suggestive of mesenteric inflammation   DDx includes mesenteric panniculitis, peritonitis, suspect autoimmune given   her history of Sjogren's disease less likely infectious.  1/15-per IR- Procedure requested:  Biopsy of omentum  Reason for procedure:  Worsening omental inflammation/peritonitis    Treatment: IVABX, Monitor vital signs, labwork, imaging  Options provided:  -- Sepsis, present on admission  -- SIRS of infectious origin without acute organ dysfunction  -- SIRS of non-infectious origin without acute organ dysfunction  -- Other - I will add my own diagnosis  -- Disagree - Not applicable / Not valid  -- Disagree - Clinically unable to determine / Unknown  -- Refer to Clinical Documentation Reviewer    PROVIDER RESPONSE TEXT:    This patient has SIRS of non-infectious origin due to without acute organ   dysfunction.    Query created by: Svitlana Person on 2025 11:44 AM      Electronically signed by:  Pippa Gunter 2025 1:08 PM

## 2025-01-18 ENCOUNTER — TELEPHONE (OUTPATIENT)
Facility: HOSPITAL | Age: 30
End: 2025-01-18

## 2025-01-18 RX ORDER — HYDROMORPHONE HYDROCHLORIDE 2 MG/1
2 TABLET ORAL EVERY 6 HOURS PRN
Qty: 12 TABLET | Refills: 0 | Status: SHIPPED | OUTPATIENT
Start: 2025-01-18 | End: 2025-01-21

## 2025-01-18 NOTE — TELEPHONE ENCOUNTER
Received notification that patient had called hospital as hydromorphone was unavailable at [facility] that prescription was sent to on patient's discharge.    Returned patient's call. Prescription sent to [facility] on [address] per patient request.    Spoke with pharmacist at [facility] and requested cancellation of original prescription.

## 2025-01-21 LAB
ANA BY IFA: POSITIVE
Lab: ABNORMAL
SPECKLED PATTERN: ABNORMAL

## 2025-01-29 ENCOUNTER — TELEPHONE (OUTPATIENT)
Dept: PRIMARY CARE CLINIC | Facility: CLINIC | Age: 30
End: 2025-01-29

## 2025-03-23 ENCOUNTER — TRANSCRIBE ORDERS (OUTPATIENT)
Facility: HOSPITAL | Age: 30
End: 2025-03-23

## 2025-03-23 DIAGNOSIS — K65.4 MESENTERIC PANNICULITIS (HCC): Primary | ICD-10-CM

## 2025-03-23 DIAGNOSIS — M35.00 SJOGREN'S SYNDROME, WITH UNSPECIFIED ORGAN INVOLVEMENT: ICD-10-CM

## 2025-04-24 ENCOUNTER — HOSPITAL ENCOUNTER (OUTPATIENT)
Facility: HOSPITAL | Age: 30
Discharge: HOME | End: 2025-04-27
Payer: COMMERCIAL

## 2025-04-24 DIAGNOSIS — M35.00 SJOGREN'S SYNDROME, WITH UNSPECIFIED ORGAN INVOLVEMENT: ICD-10-CM

## 2025-04-24 DIAGNOSIS — K65.4 MESENTERIC PANNICULITIS (HCC): ICD-10-CM

## 2025-04-24 PROCEDURE — 6360000004 HC RX CONTRAST MEDICATION: Performed by: NURSE PRACTITIONER

## 2025-04-24 PROCEDURE — 74177 CT ABD & PELVIS W/CONTRAST: CPT

## 2025-04-24 RX ORDER — IOPAMIDOL 755 MG/ML
100 INJECTION, SOLUTION INTRAVASCULAR
Status: COMPLETED | OUTPATIENT
Start: 2025-04-24 | End: 2025-04-24

## 2025-04-24 RX ADMIN — IOPAMIDOL 90 ML: 755 INJECTION, SOLUTION INTRAVENOUS at 16:54

## 2025-09-04 ENCOUNTER — HOSPITAL ENCOUNTER (EMERGENCY)
Facility: HOSPITAL | Age: 30
Discharge: HOME | End: 2025-09-04
Attending: EMERGENCY MEDICINE
Payer: COMMERCIAL

## 2025-09-04 ENCOUNTER — APPOINTMENT (OUTPATIENT)
Facility: HOSPITAL | Age: 30
End: 2025-09-04
Payer: COMMERCIAL

## 2025-09-04 VITALS
TEMPERATURE: 98.2 F | RESPIRATION RATE: 17 BRPM | OXYGEN SATURATION: 98 % | HEART RATE: 78 BPM | BODY MASS INDEX: 25.71 KG/M2 | HEIGHT: 66 IN | SYSTOLIC BLOOD PRESSURE: 109 MMHG | DIASTOLIC BLOOD PRESSURE: 73 MMHG | WEIGHT: 160 LBS

## 2025-09-04 DIAGNOSIS — R10.13 ABDOMINAL PAIN, EPIGASTRIC: Primary | ICD-10-CM

## 2025-09-04 DIAGNOSIS — K76.0 HEPATIC STEATOSIS: ICD-10-CM

## 2025-09-04 LAB
ALBUMIN SERPL-MCNC: 4.1 G/DL (ref 3.5–5.2)
ALBUMIN/GLOB SERPL: 1.3 (ref 1.1–2.2)
ALP SERPL-CCNC: 37 U/L (ref 35–104)
ALT SERPL-CCNC: 7 U/L (ref 10–35)
ANION GAP SERPL CALC-SCNC: 13 MMOL/L (ref 2–12)
APPEARANCE UR: CLEAR
AST SERPL-CCNC: 16 U/L (ref 10–35)
BACTERIA URNS QL MICRO: NEGATIVE /HPF
BASOPHILS # BLD: 0.05 K/UL (ref 0–0.1)
BASOPHILS NFR BLD: 1.1 % (ref 0–1)
BILIRUB SERPL-MCNC: 0.3 MG/DL (ref 0–1.2)
BILIRUB UR QL: NEGATIVE
BUN SERPL-MCNC: 14 MG/DL (ref 6–20)
BUN/CREAT SERPL: 16 (ref 12–20)
CALCIUM SERPL-MCNC: 9.2 MG/DL (ref 8.6–10)
CHLORIDE SERPL-SCNC: 105 MMOL/L (ref 98–107)
CO2 SERPL-SCNC: 23 MMOL/L (ref 22–29)
COLOR UR: ABNORMAL
COMMENT:: NORMAL
CREAT SERPL-MCNC: 0.9 MG/DL (ref 0.5–0.9)
DIFFERENTIAL METHOD BLD: ABNORMAL
EOSINOPHIL # BLD: 0.23 K/UL (ref 0–0.4)
EOSINOPHIL NFR BLD: 4.9 % (ref 0–7)
EPITH CASTS URNS QL MICRO: ABNORMAL /LPF
ERYTHROCYTE [DISTWIDTH] IN BLOOD BY AUTOMATED COUNT: 13.1 % (ref 11.5–14.5)
GLOBULIN SER CALC-MCNC: 3.2 G/DL (ref 2–4)
GLUCOSE SERPL-MCNC: 91 MG/DL (ref 65–100)
GLUCOSE UR STRIP.AUTO-MCNC: NEGATIVE MG/DL
HCG UR QL: NEGATIVE
HCT VFR BLD AUTO: 34.5 % (ref 35–47)
HGB BLD-MCNC: 11.8 G/DL (ref 11.5–16)
HGB UR QL STRIP: ABNORMAL
IMM GRANULOCYTES # BLD AUTO: 0.01 K/UL (ref 0–0.04)
IMM GRANULOCYTES NFR BLD AUTO: 0.2 % (ref 0–0.5)
KETONES UR QL STRIP.AUTO: NEGATIVE MG/DL
LEUKOCYTE ESTERASE UR QL STRIP.AUTO: NEGATIVE
LIPASE SERPL-CCNC: 41 U/L (ref 13–60)
LYMPHOCYTES # BLD: 1.59 K/UL (ref 0.8–3.5)
LYMPHOCYTES NFR BLD: 33.8 % (ref 12–49)
MCH RBC QN AUTO: 27.7 PG (ref 26–34)
MCHC RBC AUTO-ENTMCNC: 34.2 G/DL (ref 30–36.5)
MCV RBC AUTO: 81 FL (ref 80–99)
MONOCYTES # BLD: 0.28 K/UL (ref 0–1)
MONOCYTES NFR BLD: 6 % (ref 5–13)
NEUTS SEG # BLD: 2.54 K/UL (ref 1.8–8)
NEUTS SEG NFR BLD: 54 % (ref 32–75)
NITRITE UR QL STRIP.AUTO: NEGATIVE
NRBC # BLD: 0 K/UL (ref 0–0.01)
NRBC BLD-RTO: 0 PER 100 WBC
PH UR STRIP: 7 (ref 5–8)
PLATELET # BLD AUTO: 245 K/UL (ref 150–400)
PMV BLD AUTO: 11.4 FL (ref 8.9–12.9)
POTASSIUM SERPL-SCNC: 3.8 MMOL/L (ref 3.5–5.1)
PROT SERPL-MCNC: 7.3 G/DL (ref 6.4–8.3)
PROT UR STRIP-MCNC: NEGATIVE MG/DL
RBC # BLD AUTO: 4.26 M/UL (ref 3.8–5.2)
RBC #/AREA URNS HPF: ABNORMAL /HPF
SODIUM SERPL-SCNC: 141 MMOL/L (ref 136–145)
SP GR UR REFRACTOMETRY: 1.01 (ref 1–1.03)
SPECIMEN HOLD: NORMAL
SPECIMEN HOLD: NORMAL
UROBILINOGEN UR QL STRIP.AUTO: 0.2 EU/DL (ref 0.2–1)
WBC # BLD AUTO: 4.7 K/UL (ref 3.6–11)
WBC URNS QL MICRO: ABNORMAL /HPF (ref 0–4)

## 2025-09-04 PROCEDURE — 36415 COLL VENOUS BLD VENIPUNCTURE: CPT

## 2025-09-04 PROCEDURE — 74177 CT ABD & PELVIS W/CONTRAST: CPT

## 2025-09-04 PROCEDURE — 81001 URINALYSIS AUTO W/SCOPE: CPT

## 2025-09-04 PROCEDURE — 6360000002 HC RX W HCPCS

## 2025-09-04 PROCEDURE — 85025 COMPLETE CBC W/AUTO DIFF WBC: CPT

## 2025-09-04 PROCEDURE — 80053 COMPREHEN METABOLIC PANEL: CPT

## 2025-09-04 PROCEDURE — 99285 EMERGENCY DEPT VISIT HI MDM: CPT

## 2025-09-04 PROCEDURE — 81025 URINE PREGNANCY TEST: CPT

## 2025-09-04 PROCEDURE — 83690 ASSAY OF LIPASE: CPT

## 2025-09-04 PROCEDURE — 6360000004 HC RX CONTRAST MEDICATION: Performed by: EMERGENCY MEDICINE

## 2025-09-04 PROCEDURE — 96374 THER/PROPH/DIAG INJ IV PUSH: CPT

## 2025-09-04 RX ORDER — IOPAMIDOL 755 MG/ML
100 INJECTION, SOLUTION INTRAVASCULAR
Status: COMPLETED | OUTPATIENT
Start: 2025-09-04 | End: 2025-09-04

## 2025-09-04 RX ORDER — ONDANSETRON 4 MG/1
8 TABLET, ORALLY DISINTEGRATING ORAL ONCE
Status: DISCONTINUED | OUTPATIENT
Start: 2025-09-04 | End: 2025-09-04 | Stop reason: HOSPADM

## 2025-09-04 RX ORDER — ONDANSETRON 2 MG/ML
4 INJECTION INTRAMUSCULAR; INTRAVENOUS ONCE
Status: DISCONTINUED | OUTPATIENT
Start: 2025-09-04 | End: 2025-09-04

## 2025-09-04 RX ORDER — KETOROLAC TROMETHAMINE 30 MG/ML
15 INJECTION, SOLUTION INTRAMUSCULAR; INTRAVENOUS ONCE
Status: COMPLETED | OUTPATIENT
Start: 2025-09-04 | End: 2025-09-04

## 2025-09-04 RX ORDER — 0.9 % SODIUM CHLORIDE 0.9 %
500 INTRAVENOUS SOLUTION INTRAVENOUS ONCE
Status: DISCONTINUED | OUTPATIENT
Start: 2025-09-04 | End: 2025-09-04 | Stop reason: HOSPADM

## 2025-09-04 RX ADMIN — IOPAMIDOL 100 ML: 755 INJECTION, SOLUTION INTRAVENOUS at 17:11

## 2025-09-04 RX ADMIN — KETOROLAC TROMETHAMINE 15 MG: 30 INJECTION, SOLUTION INTRAMUSCULAR at 18:29

## 2025-09-04 ASSESSMENT — PAIN - FUNCTIONAL ASSESSMENT: PAIN_FUNCTIONAL_ASSESSMENT: 0-10

## 2025-09-04 ASSESSMENT — PAIN DESCRIPTION - LOCATION: LOCATION: ABDOMEN

## 2025-09-04 ASSESSMENT — PAIN SCALES - GENERAL: PAINLEVEL_OUTOF10: 6

## 2025-09-04 ASSESSMENT — LIFESTYLE VARIABLES
HOW MANY STANDARD DRINKS CONTAINING ALCOHOL DO YOU HAVE ON A TYPICAL DAY: PATIENT DOES NOT DRINK
HOW OFTEN DO YOU HAVE A DRINK CONTAINING ALCOHOL: NEVER

## (undated) DEVICE — SUPPLEMENT DIGESTIVE H2O SOL GI-EASE

## (undated) DEVICE — FORCEPS BX L240CM JAW DIA2.4MM ORNG L CAP W/ NDL DISP RAD